# Patient Record
Sex: FEMALE | Race: WHITE | NOT HISPANIC OR LATINO | Employment: PART TIME | URBAN - METROPOLITAN AREA
[De-identification: names, ages, dates, MRNs, and addresses within clinical notes are randomized per-mention and may not be internally consistent; named-entity substitution may affect disease eponyms.]

---
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
## 2022-04-28 DIAGNOSIS — R74.8 LOW SERUM ALKALINE PHOSPHATASE: ICD-10-CM

## 2022-04-28 DIAGNOSIS — E83.39 ADULT HYPOPHOSPHATASIA: Primary | ICD-10-CM

## 2022-04-28 DIAGNOSIS — E78.5 DYSLIPIDEMIA: ICD-10-CM

## 2022-04-28 DIAGNOSIS — R94.6 THYROID FUNCTION TEST ABNORMAL: ICD-10-CM

## 2022-04-28 DIAGNOSIS — E55.9 HYPOVITAMINOSIS D: ICD-10-CM

## 2022-04-28 DIAGNOSIS — R73.09 DYSGLYCEMIA: ICD-10-CM

## 2022-04-28 DIAGNOSIS — R82.994 HYPERCALCIURIA: ICD-10-CM

## 2022-05-02 ENCOUNTER — PATIENT MESSAGE (OUTPATIENT)
Dept: ENDOCRINOLOGY | Facility: CLINIC | Age: 22
End: 2022-05-02

## 2022-05-02 ENCOUNTER — OFFICE VISIT (OUTPATIENT)
Dept: ENDOCRINOLOGY | Facility: CLINIC | Age: 22
End: 2022-05-02
Payer: COMMERCIAL

## 2022-05-02 DIAGNOSIS — E83.39 HYPOPHOSPHATASIA: Primary | ICD-10-CM

## 2022-05-02 DIAGNOSIS — E83.39: ICD-10-CM

## 2022-05-02 DIAGNOSIS — G47.10 HYPERSOMNOLENT: ICD-10-CM

## 2022-05-02 DIAGNOSIS — M85.80 OSTEOPENIA, UNSPECIFIED LOCATION: ICD-10-CM

## 2022-05-02 DIAGNOSIS — E55.9 HYPOVITAMINOSIS D: ICD-10-CM

## 2022-05-02 DIAGNOSIS — N92.6 MENSTRUAL IRREGULARITY: ICD-10-CM

## 2022-05-02 DIAGNOSIS — Z72.820 SLEEP DEPRIVATION: ICD-10-CM

## 2022-05-02 DIAGNOSIS — F98.8 ATTENTION DEFICIT DISORDER, UNSPECIFIED HYPERACTIVITY PRESENCE: ICD-10-CM

## 2022-05-02 DIAGNOSIS — R74.8 LOW SERUM ALKALINE PHOSPHATASE: ICD-10-CM

## 2022-05-02 DIAGNOSIS — R82.994 HYPERCALCIURIA: ICD-10-CM

## 2022-05-02 PROCEDURE — 1159F PR MEDICATION LIST DOCUMENTED IN MEDICAL RECORD: ICD-10-PCS | Mod: CPTII,95,, | Performed by: INTERNAL MEDICINE

## 2022-05-02 PROCEDURE — 1160F RVW MEDS BY RX/DR IN RCRD: CPT | Mod: CPTII,95,, | Performed by: INTERNAL MEDICINE

## 2022-05-02 PROCEDURE — 99214 OFFICE O/P EST MOD 30 MIN: CPT | Mod: 95,,, | Performed by: INTERNAL MEDICINE

## 2022-05-02 PROCEDURE — 99214 PR OFFICE/OUTPT VISIT, EST, LEVL IV, 30-39 MIN: ICD-10-PCS | Mod: 95,,, | Performed by: INTERNAL MEDICINE

## 2022-05-02 PROCEDURE — 1160F PR REVIEW ALL MEDS BY PRESCRIBER/CLIN PHARMACIST DOCUMENTED: ICD-10-PCS | Mod: CPTII,95,, | Performed by: INTERNAL MEDICINE

## 2022-05-02 PROCEDURE — 1159F MED LIST DOCD IN RCRD: CPT | Mod: CPTII,95,, | Performed by: INTERNAL MEDICINE

## 2022-05-02 NOTE — PROGRESS NOTES
Subjective:      Patient ID: Munira Ocasio is a 21 y.o. female.    Chief Complaint:     Patient is the daughter of another patient of the clinic (Rafaela Ocasio MRN; [id number])   seen in [facility] today for screening and up for possible adult hypophosphatasia. This is a video televisit and thus he examination aspects of the visit are limited.    History of Present Illness    The patient, Munira Ocasio is a 20 yr old lady seen in [facility] today on account of possible adult hypophatasia.  Her mother ((Rafaela Ocasio - MRN; [id number])) is a clinic patient as well.  This is a video televisit.  The patient location is: home  The chief complaint leading to consultation is: possible hypophosphatasia    Visit type: Synchronous video and audio televisit    Face to Face time with patient: ~ 30  minutes of total time spent on the encounter, which includes face to face time and non-face to face time preparing to see the patient (eg, review of tests), Obtaining and/or reviewing separately obtained history, Documenting clinical information in the electronic or other health record, Independently interpreting results (not separately reported) and communicating results to the patient/family/caregiver, or Care coordination (not separately reported).         Each patient to whom he or she provides medical services by telemedicine is:  (1) informed of the relationship between the physician and patient and the respective role of any other health care provider with respect to management of the patient; and (2) notified that he or she may decline to receive medical services by telemedicine and may withdraw from such care at any time.    Notes:     Her background comorbidities are as detailed below;  Patient Active Problem List   Diagnosis    Attention deficit disorder    Hypophosphatasia    Low serum alkaline phosphatase    Hypovitaminosis D    Sleep deprivation    Hypersomnolent    Menstrual irregularity     Hypercalciuria    Osteopenia    Renal phosphaturia     Patients baseline [place] is 20. Patient has never had a sleep study.   Patient complains of chronic fatigue over last 6 yrs.  Patient is hypersomnolent. She sleeps ~ 8-12hrs daily. She goes to sleep early.  Patient wakes up feeling tired. She is a deep sleeper and not restless during sleep.  She has a lot of day time somnolence. She has a lot of early morning headaches.  She does not dream frequently.    Periods; irregular. 5-8/ she often skips periods She had periods once every 6months last year  Menarche; 17yrs. She has always had irregular periods.  She has had 1-2 periods thus far this year.   Grav 0.  Patient does not smoke, Patient drinks ETOH very irregularly.  Patient just graduated from high school.    Has menorrhagia++ and dysmenorrhea.. She has some hirsutism.  She had significant facial, back and chest acneiform eruptions.    Patient has never had any fractures. She does not have frequent dental problems. She did loss her early front teeth early when young.    She has generalized myalgias and arthralgias which are long standing.  She is having generalized body and bone aches and associated fatigue.         Review of Systems   Constitutional: Positive for fatigue (chronic). Negative for activity change, appetite change, chills and diaphoresis.   HENT: Negative for facial swelling, hearing loss, sinus pressure, sore throat, trouble swallowing and voice change.    Eyes: Negative for photophobia and visual disturbance.   Respiratory: Negative for apnea, cough, chest tightness, shortness of breath, wheezing and stridor.    Cardiovascular: Negative for chest pain, palpitations and leg swelling.   Gastrointestinal: Negative for abdominal distention, abdominal pain, constipation, diarrhea, nausea and vomiting.   Endocrine: Negative for cold intolerance, heat intolerance, polydipsia, polyphagia and polyuria.   Genitourinary: Positive for menstrual problem  (long standing menstrual irregularity as detailed above.). Negative for difficulty urinating, dysuria, flank pain, frequency, hematuria and urgency.   Musculoskeletal: Positive for arthralgias (multiple joints; mainly of weight bearing joints.) and myalgias (chronic). Negative for back pain, gait problem, joint swelling, neck pain and neck stiffness.   Skin: Negative for color change, pallor and rash.   Neurological: Positive for weakness (intermittent). Negative for dizziness, tremors, seizures, syncope, light-headedness, numbness and headaches.   Hematological: Does not bruise/bleed easily.   Psychiatric/Behavioral: Positive for sleep disturbance (As detailed above.). Negative for agitation, confusion, dysphoric mood and hallucinations. The patient is not nervous/anxious.        Objective: Nil video televisit.         Lab Review:     Results for RHETT TUBBS (MRN [id number]) as of 5/3/2021 09:15   Ref. Range 2/22/2016 11:44 3/13/2021 11:02   WBC Latest Ref Range: 3.9 - 12.7 K/uL  5.36   RBC Latest Ref Range: 4.00 - 5.40 M/uL  4.42   Hemoglobin Latest Ref Range: 12.0 - 16.0 g/dL  12.7   Hematocrit Latest Ref Range: 37.0 - 48.5 %  38.5   MCV Latest Ref Range: 82.0 - 98.0 fL  87   MCH Latest Ref Range: 27.0 - 31.0 pg  28.7   MCHC Latest Ref Range: 32.0 - 36.0 g/dL  33.0   RDW Latest Ref Range: 11.5 - 14.5 %  12.5   Platelets Latest Ref Range: 150 - 350 K/uL  251   MPV Latest Ref Range: 9.2 - 12.9 fL  10.4   Gran % Latest Ref Range: 38.0 - 73.0 %  44.6   Lymph % Latest Ref Range: 18.0 - 48.0 %  46.6   Mono % Latest Ref Range: 4.0 - 15.0 %  7.1   Eosinophil % Latest Ref Range: 0.0 - 8.0 %  1.1   Basophil % Latest Ref Range: 0.0 - 1.9 %  0.4   Immature Granulocytes Latest Ref Range: 0.0 - 0.5 %  0.2   Gran # (ANC) Latest Ref Range: 1.8 - 7.7 K/uL  2.4   Lymph # Latest Ref Range: 1.0 - 4.8 K/uL  2.5   Mono # Latest Ref Range: 0.3 - 1.0 K/uL  0.4   Eos # Latest Ref Range: 0.0 - 0.5 K/uL  0.1   Baso # Latest Ref Range:  0.00 - 0.20 K/uL  0.02   Immature Grans (Abs) Latest Ref Range: 0.00 - 0.04 K/uL  0.01   nRBC Latest Ref Range: 0 /100 WBC  0   Differential Method Unknown  Automated   Sodium Latest Ref Range: 136 - 145 mmol/L  140   Potassium Latest Ref Range: 3.5 - 5.1 mmol/L  4.0   Chloride Latest Ref Range: 95 - 110 mmol/L  108   CO2 Latest Ref Range: 23 - 29 mmol/L  24   Anion Gap Latest Ref Range: 8 - 16 mmol/L  8   BUN Latest Ref Range: 6 - 20 mg/dL  13   Creatinine Latest Ref Range: 0.5 - 1.4 mg/dL  0.7   eGFR if non African American Latest Ref Range: >60 mL/min/1.73 m^2  >60.0   eGFR if African American Latest Ref Range: >60 mL/min/1.73 m^2  >60.0   Glucose Latest Ref Range: 70 - 110 mg/dL  83   Calcium Latest Ref Range: 8.7 - 10.5 mg/dL  8.8   Alkaline Phosphatase Latest Ref Range: 48 - 95 U/L  28 (L)   PROTEIN TOTAL Latest Ref Range: 6.0 - 8.4 g/dL  6.4   Albumin Latest Ref Range: 3.2 - 4.7 g/dL  3.9   BILIRUBIN TOTAL Latest Ref Range: 0.1 - 1.0 mg/dL  0.6   AST Latest Ref Range: 10 - 40 U/L  14   ALT Latest Ref Range: 10 - 44 U/L  7 (L)   Triglycerides Latest Ref Range: 30 - 150 mg/dL  33   Cholesterol Latest Ref Range: 120 - 199 mg/dL  127   HDL Latest Ref Range: 40 - 75 mg/dL  56   HDL/Cholesterol Ratio Latest Ref Range: 20.0 - 50.0 %  44.1   LDL Cholesterol External Latest Ref Range: 63 - 159 mg/dL  64.4   Non-HDL Cholesterol Latest Units: mg/dL  71   Total Cholesterol/HDL Ratio Latest Ref Range: 2.0 - 5.0   2.3   TSH Latest Ref Range: 0.40 - 4.00 uIU/mL  0.581   Hepatitis C Ab Latest Ref Range: Negative   Negative   HIV 1/2 Ag/Ab Latest Ref Range: Negative   Negative   XR KNEE ORTHO RIGHT WITH FLEXION Unknown Rpt        Assessment:     1. Hypophosphatasia     2. Hypovitaminosis D     3. Osteopenia, unspecified location     4. Low serum alkaline phosphatase     5. Hypersomnolent     6. Hypercalciuria     7. Renal phosphaturia     8. Attention deficit disorder, unspecified hyperactivity presence     9. Sleep  deprivation     10. Menstrual irregularity  US Pelvis Complete Non OB    Testosterone Panel    Estradiol    Follicle Stimulating Hormone    Estrogens, Total    Progesterone    Prolactin    Dihydrotestosterone    Luteinizing Hormone      Regarding low alkaline phosphatase levels; Given the patients history and her associated sympotomatology and +ve family history it does appear that she has adult hypophosphatasia. To obtain confirmatory labs as detailed above and once diagnosis is confirmed will arrange to commence repletion therapy with alpha asofatase (strensiq). [facility] was not able to get the definitive lab testing done @ her last visit.  Regarding ADHD; stable to continue stimulant amphetamine therapy as before.  Regarding menstrual irregularity with mild hirsutism;  To retrieve recent pelvic USS results and obtain relevant labs as detailed above.  Regarding chronic sleep deprivation; to obtain formal sleep study to evaluate patient on account of hypersomnolence, non restorative sleep and chronic fatigue.  Regarding possible hypovitaminosis D; to check 25 OH vit D levels and replete as needed.  Regarding menstrual irregularities; to recheck basic ovarian functional testing and obtain pelvic ultrasound.      Plan:     FFup in 6 mths

## 2022-05-05 ENCOUNTER — OFFICE VISIT (OUTPATIENT)
Dept: FAMILY MEDICINE | Facility: CLINIC | Age: 22
End: 2022-05-05
Payer: COMMERCIAL

## 2022-05-05 VITALS
HEART RATE: 89 BPM | OXYGEN SATURATION: 96 % | BODY MASS INDEX: 22.1 KG/M2 | WEIGHT: 117.06 LBS | SYSTOLIC BLOOD PRESSURE: 100 MMHG | DIASTOLIC BLOOD PRESSURE: 60 MMHG | HEIGHT: 61 IN

## 2022-05-05 DIAGNOSIS — R05.9 COUGH: Primary | ICD-10-CM

## 2022-05-05 DIAGNOSIS — L50.8 CHRONIC URTICARIA: ICD-10-CM

## 2022-05-05 DIAGNOSIS — R06.02 SOB (SHORTNESS OF BREATH): ICD-10-CM

## 2022-05-05 DIAGNOSIS — F90.9 ATTENTION DEFICIT HYPERACTIVITY DISORDER (ADHD), UNSPECIFIED ADHD TYPE: ICD-10-CM

## 2022-05-05 DIAGNOSIS — E83.39 HYPOPHOSPHATASIA: ICD-10-CM

## 2022-05-05 PROCEDURE — 1159F MED LIST DOCD IN RCRD: CPT | Mod: CPTII,S$GLB,, | Performed by: NURSE PRACTITIONER

## 2022-05-05 PROCEDURE — 1159F PR MEDICATION LIST DOCUMENTED IN MEDICAL RECORD: ICD-10-PCS | Mod: CPTII,S$GLB,, | Performed by: NURSE PRACTITIONER

## 2022-05-05 PROCEDURE — 1160F RVW MEDS BY RX/DR IN RCRD: CPT | Mod: CPTII,S$GLB,, | Performed by: NURSE PRACTITIONER

## 2022-05-05 PROCEDURE — 96372 PR INJECTION,THERAP/PROPH/DIAG2ST, IM OR SUBCUT: ICD-10-PCS | Mod: S$GLB,,, | Performed by: NURSE PRACTITIONER

## 2022-05-05 PROCEDURE — 99214 OFFICE O/P EST MOD 30 MIN: CPT | Mod: 25,S$GLB,, | Performed by: NURSE PRACTITIONER

## 2022-05-05 PROCEDURE — 3078F DIAST BP <80 MM HG: CPT | Mod: CPTII,S$GLB,, | Performed by: NURSE PRACTITIONER

## 2022-05-05 PROCEDURE — 3008F BODY MASS INDEX DOCD: CPT | Mod: CPTII,S$GLB,, | Performed by: NURSE PRACTITIONER

## 2022-05-05 PROCEDURE — 99999 PR PBB SHADOW E&M-EST. PATIENT-LVL V: CPT | Mod: PBBFAC,,, | Performed by: NURSE PRACTITIONER

## 2022-05-05 PROCEDURE — 1160F PR REVIEW ALL MEDS BY PRESCRIBER/CLIN PHARMACIST DOCUMENTED: ICD-10-PCS | Mod: CPTII,S$GLB,, | Performed by: NURSE PRACTITIONER

## 2022-05-05 PROCEDURE — 99214 PR OFFICE/OUTPT VISIT, EST, LEVL IV, 30-39 MIN: ICD-10-PCS | Mod: 25,S$GLB,, | Performed by: NURSE PRACTITIONER

## 2022-05-05 PROCEDURE — 94640 AIRWAY INHALATION TREATMENT: CPT | Mod: 59,S$GLB,, | Performed by: NURSE PRACTITIONER

## 2022-05-05 PROCEDURE — 3074F PR MOST RECENT SYSTOLIC BLOOD PRESSURE < 130 MM HG: ICD-10-PCS | Mod: CPTII,S$GLB,, | Performed by: NURSE PRACTITIONER

## 2022-05-05 PROCEDURE — 3078F PR MOST RECENT DIASTOLIC BLOOD PRESSURE < 80 MM HG: ICD-10-PCS | Mod: CPTII,S$GLB,, | Performed by: NURSE PRACTITIONER

## 2022-05-05 PROCEDURE — 3074F SYST BP LT 130 MM HG: CPT | Mod: CPTII,S$GLB,, | Performed by: NURSE PRACTITIONER

## 2022-05-05 PROCEDURE — 96372 THER/PROPH/DIAG INJ SC/IM: CPT | Mod: S$GLB,,, | Performed by: NURSE PRACTITIONER

## 2022-05-05 PROCEDURE — 99999 PR PBB SHADOW E&M-EST. PATIENT-LVL V: ICD-10-PCS | Mod: PBBFAC,,, | Performed by: NURSE PRACTITIONER

## 2022-05-05 PROCEDURE — 94640 PR INHAL RX, AIRWAY OBST/DX SPUTUM INDUCT: ICD-10-PCS | Mod: 59,S$GLB,, | Performed by: NURSE PRACTITIONER

## 2022-05-05 PROCEDURE — 3008F PR BODY MASS INDEX (BMI) DOCUMENTED: ICD-10-PCS | Mod: CPTII,S$GLB,, | Performed by: NURSE PRACTITIONER

## 2022-05-05 RX ORDER — FLUOXETINE 10 MG/1
10 CAPSULE ORAL DAILY
COMMUNITY
Start: 2022-01-05 | End: 2023-07-18

## 2022-05-05 RX ORDER — IPRATROPIUM BROMIDE AND ALBUTEROL SULFATE 2.5; .5 MG/3ML; MG/3ML
3 SOLUTION RESPIRATORY (INHALATION)
Status: COMPLETED | OUTPATIENT
Start: 2022-05-05 | End: 2022-05-05

## 2022-05-05 RX ORDER — BETAMETHASONE SODIUM PHOSPHATE AND BETAMETHASONE ACETATE 3; 3 MG/ML; MG/ML
6 INJECTION, SUSPENSION INTRA-ARTICULAR; INTRALESIONAL; INTRAMUSCULAR; SOFT TISSUE ONCE
Status: COMPLETED | OUTPATIENT
Start: 2022-05-05 | End: 2022-05-05

## 2022-05-05 RX ORDER — DOXYCYCLINE 100 MG/1
100 CAPSULE ORAL 2 TIMES DAILY
Qty: 14 CAPSULE | Refills: 0 | Status: SHIPPED | OUTPATIENT
Start: 2022-05-05 | End: 2022-05-12

## 2022-05-05 RX ORDER — PROMETHAZINE HYDROCHLORIDE AND DEXTROMETHORPHAN HYDROBROMIDE 6.25; 15 MG/5ML; MG/5ML
SYRUP ORAL
COMMUNITY
Start: 2022-05-03 | End: 2023-07-18

## 2022-05-05 RX ORDER — BALOXAVIR MARBOXIL 40 MG/1
TABLET, FILM COATED ORAL DAILY PRN
COMMUNITY
Start: 2022-05-03 | End: 2023-07-18

## 2022-05-05 RX ORDER — IPRATROPIUM BROMIDE AND ALBUTEROL SULFATE 2.5; .5 MG/3ML; MG/3ML
3 SOLUTION RESPIRATORY (INHALATION) EVERY 6 HOURS PRN
Qty: 75 ML | Refills: 2 | Status: SHIPPED | OUTPATIENT
Start: 2022-05-05 | End: 2023-07-18

## 2022-05-05 RX ORDER — AZITHROMYCIN 250 MG/1
250 TABLET, FILM COATED ORAL
COMMUNITY
Start: 2022-05-03 | End: 2023-07-18

## 2022-05-05 RX ADMIN — BETAMETHASONE SODIUM PHOSPHATE AND BETAMETHASONE ACETATE 6 MG: 3; 3 INJECTION, SUSPENSION INTRA-ARTICULAR; INTRALESIONAL; INTRAMUSCULAR; SOFT TISSUE at 04:05

## 2022-05-05 RX ADMIN — IPRATROPIUM BROMIDE AND ALBUTEROL SULFATE 3 ML: 2.5; .5 SOLUTION RESPIRATORY (INHALATION) at 04:05

## 2022-05-05 NOTE — PROGRESS NOTES
Subjective:       Patient ID: Munira Ocasio is a 21 y.o. female.    Chief Complaint: Nasal Congestion and Headache    HPI  Patient presents today for follow up visit. She is new to me. history of depression anxiety and ADHD currently on Prozac, on Ritalin. She c/o nasal congestion and headache.    5/2/22: endo [place] hypophosphatasia, hypovitaminosis  11/13/21 ER visit for PVC: CBC with normal white blood cell count normal H&H, normal chemistry, she has a non elevated troponin, non elevated D-dimer.  Her chest x-ray is normal, her EKG normal sinus rhythm with a rate of 72 beats per minute with sinus arrhythmia noted.  Her vitals are stable she appears in no distress.  She is taking Ritalin stimulant medicine which could contribute to her symptoms also admits to caffeine   7/21 Heather Welsh for depression and ADHD  Past Medical History:   Diagnosis Date    ADHD (attention deficit hyperactivity disorder)     Anxiety     Depression        Review of patient's allergies indicates:  No Known Allergies      Current Outpatient Medications:     azithromycin (Z-MATILDA) 250 MG tablet, Take 250 mg by mouth., Disp: , Rfl:     busPIRone (BUSPAR) 5 MG Tab, , Disp: , Rfl:     FLUoxetine 20 MG capsule, Take 20 mg by mouth once daily., Disp: , Rfl:     loratadine (CLARITIN) 10 mg tablet, Take 10 mg by mouth once daily., Disp: , Rfl:     methylphenidate (RITALIN) 5 MG tablet, Take 5 mg by mouth 2 (two) times daily., Disp: , Rfl:     promethazine-dextromethorphan (PROMETHAZINE-DM) 6.25-15 mg/5 mL Syrp, Take by mouth., Disp: , Rfl:     XOFLUZA 40 mg tablet, Take by mouth daily as needed., Disp: , Rfl:     albuterol-ipratropium (DUO-NEB) 2.5 mg-0.5 mg/3 mL nebulizer solution, Take 3 mLs by nebulization every 6 (six) hours as needed for Wheezing. Rescue, Disp: 75 mL, Rfl: 2    doxycycline (MONODOX) 100 MG capsule, Take 1 capsule (100 mg total) by mouth 2 (two) times daily. for 7 days, Disp: 14 capsule, Rfl: 0     "FLUoxetine 10 MG capsule, Take 10 mg by mouth once daily., Disp: , Rfl:     Review of Systems   Constitutional: Positive for fatigue. Negative for unexpected weight change.   HENT: Positive for rhinorrhea, sinus pressure and sinus pain. Negative for sore throat and trouble swallowing.    Eyes: Negative for visual disturbance.   Respiratory: Positive for cough and shortness of breath.    Cardiovascular: Positive for chest pain. Negative for palpitations and leg swelling.   Gastrointestinal: Negative for blood in stool.   Genitourinary: Negative for hematuria.   Skin: Negative for rash.   Allergic/Immunologic: Negative for immunocompromised state.   Neurological: Negative for headaches.   Hematological: Does not bruise/bleed easily.   Psychiatric/Behavioral: Negative for agitation. The patient is not nervous/anxious.        Objective:      /60 (BP Location: Left arm, Patient Position: Sitting, BP Method: Small (Manual))   Pulse 89   Ht 5' 1" (1.549 m)   Wt 53.1 kg (117 lb 1 oz)   LMP 04/29/2022 (Approximate)   SpO2 96%   BMI 22.12 kg/m²   Physical Exam  Constitutional:       Appearance: She is well-developed.   Eyes:      Pupils: Pupils are equal, round, and reactive to light.   Cardiovascular:      Rate and Rhythm: Normal rate and regular rhythm.      Heart sounds: Normal heart sounds.   Pulmonary:      Effort: Pulmonary effort is normal.      Breath sounds: Normal breath sounds.   Abdominal:      General: Bowel sounds are normal.      Palpations: Abdomen is soft.   Musculoskeletal:         General: Normal range of motion.      Cervical back: Normal range of motion.   Skin:     General: Skin is warm and dry.   Neurological:      Mental Status: She is alert and oriented to person, place, and time.   Psychiatric:         Behavior: Behavior normal.         Thought Content: Thought content normal.         Judgment: Judgment normal.         Assessment:       1. Cough    2. SOB (shortness of breath)    3. " Chronic urticaria    4. Attention deficit hyperactivity disorder (ADHD), unspecified ADHD type    5. Hypophosphatasia    6. BMI 22.0-22.9, adult        Plan:       Cough  -     X-Ray Chest PA And Lateral; Future; Expected date: 05/05/2022  -     betamethasone acetate-betamethasone sodium phosphate injection 6 mg  -     albuterol-ipratropium 2.5 mg-0.5 mg/3 mL nebulizer solution 3 mL  -     doxycycline (MONODOX) 100 MG capsule; Take 1 capsule (100 mg total) by mouth 2 (two) times daily. for 7 days  Dispense: 14 capsule; Refill: 0  -     albuterol-ipratropium (DUO-NEB) 2.5 mg-0.5 mg/3 mL nebulizer solution; Take 3 mLs by nebulization every 6 (six) hours as needed for Wheezing. Rescue  Dispense: 75 mL; Refill: 2    SOB (shortness of breath)  -     X-Ray Chest PA And Lateral; Future; Expected date: 05/05/2022  -     albuterol-ipratropium 2.5 mg-0.5 mg/3 mL nebulizer solution 3 mL    Chronic urticaria  -     betamethasone acetate-betamethasone sodium phosphate injection 6 mg    Attention deficit hyperactivity disorder (ADHD), unspecified ADHD type  Stable, continue follow up  Hypophosphatasia  Stable, continue endo follow up  BMI 22.0-22.9, adult  Stable, continue managment        Time spent with patient: 20 minutes    Patient with be reevaluated in 4 weeks or sooner prn    Greater than 50% of this visit was spent counseling as described in above documentation:Yes

## 2022-05-05 NOTE — PROGRESS NOTES
Identified name and . Administered 6 mg betamethasone acetate, IM. Patient tolerated well, aseptic technique maintained. Pain scale 0/10 with injection. No residual bleeding at insertion site noted.    Pt name and  verified. Albuterol ipratropium administered via nebulization. Pt tolerated well, provider will check breath sounds after administration.

## 2022-05-06 ENCOUNTER — HOSPITAL ENCOUNTER (OUTPATIENT)
Dept: RADIOLOGY | Facility: CLINIC | Age: 22
Discharge: HOME | End: 2022-05-06
Attending: NURSE PRACTITIONER
Payer: COMMERCIAL

## 2022-05-06 DIAGNOSIS — R06.02 SOB (SHORTNESS OF BREATH): ICD-10-CM

## 2022-05-06 DIAGNOSIS — R05.9 COUGH: ICD-10-CM

## 2022-05-06 PROCEDURE — 71046 X-RAY EXAM CHEST 2 VIEWS: CPT | Mod: 26,,, | Performed by: RADIOLOGY

## 2022-05-06 PROCEDURE — 71046 X-RAY EXAM CHEST 2 VIEWS: CPT | Mod: TC,FY,PO

## 2022-05-06 PROCEDURE — 71046 XR CHEST PA AND LATERAL: ICD-10-PCS | Mod: 26,,, | Performed by: RADIOLOGY

## 2022-05-11 ENCOUNTER — HOSPITAL ENCOUNTER (OUTPATIENT)
Dept: RADIOLOGY | Facility: HOSPITAL | Age: 22
Discharge: HOME | End: 2022-05-11
Attending: INTERNAL MEDICINE
Payer: COMMERCIAL

## 2022-05-11 ENCOUNTER — LAB VISIT (OUTPATIENT)
Dept: LAB | Facility: HOSPITAL | Age: 22
End: 2022-05-11
Attending: INTERNAL MEDICINE
Payer: COMMERCIAL

## 2022-05-11 DIAGNOSIS — N92.6 MENSTRUAL IRREGULARITY: ICD-10-CM

## 2022-05-11 DIAGNOSIS — R82.994 HYPERCALCIURIA: ICD-10-CM

## 2022-05-11 DIAGNOSIS — E55.9 HYPOVITAMINOSIS D: ICD-10-CM

## 2022-05-11 DIAGNOSIS — E78.5 DYSLIPIDEMIA: ICD-10-CM

## 2022-05-11 DIAGNOSIS — R94.6 THYROID FUNCTION TEST ABNORMAL: ICD-10-CM

## 2022-05-11 DIAGNOSIS — R73.09 DYSGLYCEMIA: ICD-10-CM

## 2022-05-11 DIAGNOSIS — R74.8 LOW SERUM ALKALINE PHOSPHATASE: ICD-10-CM

## 2022-05-11 DIAGNOSIS — E83.39 ADULT HYPOPHOSPHATASIA: ICD-10-CM

## 2022-05-11 LAB
25(OH)D3+25(OH)D2 SERPL-MCNC: 26 NG/ML (ref 30–96)
BASOPHILS # BLD AUTO: 0.05 K/UL (ref 0–0.2)
BASOPHILS NFR BLD: 0.7 % (ref 0–1.9)
CA-I BLDV-SCNC: 1.25 MMOL/L (ref 1.06–1.42)
DIFFERENTIAL METHOD: ABNORMAL
EOSINOPHIL # BLD AUTO: 0.1 K/UL (ref 0–0.5)
EOSINOPHIL NFR BLD: 1.1 % (ref 0–8)
ERYTHROCYTE [DISTWIDTH] IN BLOOD BY AUTOMATED COUNT: 12.3 % (ref 11.5–14.5)
ESTIMATED AVG GLUCOSE: 91 MG/DL (ref 68–131)
HBA1C MFR BLD: 4.8 % (ref 4–5.6)
HCT VFR BLD AUTO: 42.9 % (ref 37–48.5)
HGB BLD-MCNC: 14 G/DL (ref 12–16)
IMM GRANULOCYTES # BLD AUTO: 0.15 K/UL (ref 0–0.04)
IMM GRANULOCYTES NFR BLD AUTO: 2.1 % (ref 0–0.5)
LYMPHOCYTES # BLD AUTO: 3 K/UL (ref 1–4.8)
LYMPHOCYTES NFR BLD: 42.5 % (ref 18–48)
MCH RBC QN AUTO: 28.6 PG (ref 27–31)
MCHC RBC AUTO-ENTMCNC: 32.6 G/DL (ref 32–36)
MCV RBC AUTO: 88 FL (ref 82–98)
MONOCYTES # BLD AUTO: 0.4 K/UL (ref 0.3–1)
MONOCYTES NFR BLD: 6.2 % (ref 4–15)
NEUTROPHILS # BLD AUTO: 3.4 K/UL (ref 1.8–7.7)
NEUTROPHILS NFR BLD: 47.4 % (ref 38–73)
NRBC BLD-RTO: 0 /100 WBC
PLATELET # BLD AUTO: 294 K/UL (ref 150–450)
PMV BLD AUTO: 9.5 FL (ref 9.2–12.9)
PTH-INTACT SERPL-MCNC: 57.7 PG/ML (ref 9–77)
RBC # BLD AUTO: 4.9 M/UL (ref 4–5.4)
WBC # BLD AUTO: 7.08 K/UL (ref 3.9–12.7)

## 2022-05-11 PROCEDURE — 80053 COMPREHEN METABOLIC PANEL: CPT | Performed by: INTERNAL MEDICINE

## 2022-05-11 PROCEDURE — 82306 VITAMIN D 25 HYDROXY: CPT | Performed by: INTERNAL MEDICINE

## 2022-05-11 PROCEDURE — 82542 COL CHROMOTOGRAPHY QUAL/QUAN: CPT | Performed by: INTERNAL MEDICINE

## 2022-05-11 PROCEDURE — 76856 US PELVIS COMPLETE NON OB: ICD-10-PCS | Mod: 26,,, | Performed by: RADIOLOGY

## 2022-05-11 PROCEDURE — 84144 ASSAY OF PROGESTERONE: CPT | Performed by: INTERNAL MEDICINE

## 2022-05-11 PROCEDURE — 84075 ASSAY ALKALINE PHOSPHATASE: CPT | Performed by: INTERNAL MEDICINE

## 2022-05-11 PROCEDURE — 84207 ASSAY OF VITAMIN B-6: CPT | Performed by: INTERNAL MEDICINE

## 2022-05-11 PROCEDURE — 30000890 HC MISC. SEND OUT TEST

## 2022-05-11 PROCEDURE — 84443 ASSAY THYROID STIM HORMONE: CPT | Performed by: INTERNAL MEDICINE

## 2022-05-11 PROCEDURE — 76856 US EXAM PELVIC COMPLETE: CPT | Mod: TC

## 2022-05-11 PROCEDURE — 83970 ASSAY OF PARATHORMONE: CPT | Performed by: INTERNAL MEDICINE

## 2022-05-11 PROCEDURE — 83036 HEMOGLOBIN GLYCOSYLATED A1C: CPT | Performed by: INTERNAL MEDICINE

## 2022-05-11 PROCEDURE — 83002 ASSAY OF GONADOTROPIN (LH): CPT | Performed by: INTERNAL MEDICINE

## 2022-05-11 PROCEDURE — 76856 US EXAM PELVIC COMPLETE: CPT | Mod: 26,,, | Performed by: RADIOLOGY

## 2022-05-11 PROCEDURE — 84075 ASSAY ALKALINE PHOSPHATASE: CPT | Mod: 91 | Performed by: INTERNAL MEDICINE

## 2022-05-11 PROCEDURE — 30000890 MISCELLANEOUS SENDOUT TEST, NON-BLOOD: Performed by: INTERNAL MEDICINE

## 2022-05-11 PROCEDURE — 84550 ASSAY OF BLOOD/URIC ACID: CPT | Performed by: INTERNAL MEDICINE

## 2022-05-11 PROCEDURE — 85025 COMPLETE CBC W/AUTO DIFF WBC: CPT | Performed by: INTERNAL MEDICINE

## 2022-05-11 PROCEDURE — 83001 ASSAY OF GONADOTROPIN (FSH): CPT | Performed by: INTERNAL MEDICINE

## 2022-05-11 PROCEDURE — 82040 ASSAY OF SERUM ALBUMIN: CPT | Performed by: INTERNAL MEDICINE

## 2022-05-11 PROCEDURE — 83735 ASSAY OF MAGNESIUM: CPT | Performed by: INTERNAL MEDICINE

## 2022-05-11 PROCEDURE — 84146 ASSAY OF PROLACTIN: CPT | Performed by: INTERNAL MEDICINE

## 2022-05-11 PROCEDURE — 82670 ASSAY OF TOTAL ESTRADIOL: CPT | Performed by: INTERNAL MEDICINE

## 2022-05-11 PROCEDURE — 82139 AMINO ACIDS QUAN 6 OR MORE: CPT | Performed by: INTERNAL MEDICINE

## 2022-05-11 PROCEDURE — 82330 ASSAY OF CALCIUM: CPT | Performed by: INTERNAL MEDICINE

## 2022-05-11 PROCEDURE — 80061 LIPID PANEL: CPT | Performed by: INTERNAL MEDICINE

## 2022-05-11 PROCEDURE — 84100 ASSAY OF PHOSPHORUS: CPT | Performed by: INTERNAL MEDICINE

## 2022-05-11 PROCEDURE — 82642 DIHYDROTESTOSTERONE: CPT | Performed by: INTERNAL MEDICINE

## 2022-05-11 PROCEDURE — 82672 ASSAY OF ESTROGEN: CPT | Performed by: INTERNAL MEDICINE

## 2022-05-11 PROCEDURE — 30000890 MAYO MISCELLANEOUS TEST (REFLEX): Performed by: INTERNAL MEDICINE

## 2022-05-11 PROCEDURE — 84207 ASSAY OF VITAMIN B-6: CPT

## 2022-05-12 LAB
ALBUMIN SERPL BCP-MCNC: 4.1 G/DL (ref 3.5–5.2)
ALP SERPL-CCNC: 26 U/L (ref 55–135)
ALT SERPL W/O P-5'-P-CCNC: 15 U/L (ref 10–44)
ANION GAP SERPL CALC-SCNC: 9 MMOL/L (ref 8–16)
AST SERPL-CCNC: 20 U/L (ref 10–40)
BILIRUB SERPL-MCNC: 0.4 MG/DL (ref 0.1–1)
BUN SERPL-MCNC: 11 MG/DL (ref 6–20)
CALCIUM SERPL-MCNC: 9.6 MG/DL (ref 8.7–10.5)
CHLORIDE SERPL-SCNC: 106 MMOL/L (ref 95–110)
CHOLEST SERPL-MCNC: 148 MG/DL (ref 120–199)
CHOLEST/HDLC SERPL: 2.6 {RATIO} (ref 2–5)
CO2 SERPL-SCNC: 24 MMOL/L (ref 23–29)
CREAT SERPL-MCNC: 0.7 MG/DL (ref 0.5–1.4)
EST. GFR  (AFRICAN AMERICAN): >60 ML/MIN/1.73 M^2
EST. GFR  (NON AFRICAN AMERICAN): >60 ML/MIN/1.73 M^2
ESTRADIOL SERPL-MCNC: 28 PG/ML
FSH SERPL-ACNC: 6.04 MIU/ML
GLUCOSE SERPL-MCNC: 83 MG/DL (ref 70–110)
HDLC SERPL-MCNC: 58 MG/DL (ref 40–75)
HDLC SERPL: 39.2 % (ref 20–50)
LDLC SERPL CALC-MCNC: 82.2 MG/DL (ref 63–159)
LH SERPL-ACNC: 14.2 MIU/ML
MAGNESIUM SERPL-MCNC: 2.1 MG/DL (ref 1.6–2.6)
NONHDLC SERPL-MCNC: 90 MG/DL
PHOSPHATE SERPL-MCNC: 4.9 MG/DL (ref 2.7–4.5)
POTASSIUM SERPL-SCNC: 4 MMOL/L (ref 3.5–5.1)
PROGEST SERPL-MCNC: 0.4 NG/ML
PROLACTIN SERPL IA-MCNC: 12.4 NG/ML (ref 5.2–26.5)
PROT SERPL-MCNC: 6.9 G/DL (ref 6–8.4)
SODIUM SERPL-SCNC: 139 MMOL/L (ref 136–145)
TRIGL SERPL-MCNC: 39 MG/DL (ref 30–150)
TSH SERPL DL<=0.005 MIU/L-ACNC: 0.84 UIU/ML (ref 0.4–4)
URATE SERPL-MCNC: 4.6 MG/DL (ref 2.4–5.7)

## 2022-05-16 LAB
ALP BONE SERPL-MCNC: 5.5 UG/L (ref 4.7–17.8)
ESTROGEN SERPL-MCNC: 61 PG/ML
MAYO MISCELLANEOUS RESULT (REF): NORMAL

## 2022-05-17 DIAGNOSIS — E83.39 ADULT HYPOPHOSPHATASIA: Primary | ICD-10-CM

## 2022-05-17 LAB
ALBUMIN SERPL-MCNC: 4.3 G/DL (ref 3.6–5.1)
MAYO MISCELLANEOUS RESULT (REF): NORMAL
PYRIDOXAL SERPL-MCNC: 83 UG/L (ref 5–50)
SHBG SERPL-SCNC: 36 NMOL/L (ref 17–124)
TESTOST FREE SERPL-MCNC: 2 PG/ML (ref 0.2–5)
TESTOST SERPL-MCNC: 18 NG/DL (ref 2–45)
TESTOSTERONE.FREE+WB SERPL-MCNC: 3.9 NG/DL (ref 0.5–8.5)

## 2022-05-17 RX ORDER — ASFOTASE ALFA 80 MG/.8ML
80 SOLUTION SUBCUTANEOUS
Qty: 36 EACH | Refills: 3 | Status: SHIPPED | OUTPATIENT
Start: 2022-05-18 | End: 2022-08-23 | Stop reason: SDUPTHER

## 2022-05-18 ENCOUNTER — SPECIALTY PHARMACY (OUTPATIENT)
Dept: PHARMACY | Facility: CLINIC | Age: 22
End: 2022-05-18

## 2022-05-18 NOTE — TELEPHONE ENCOUNTER
Strensiq PA submitted via CM    If approved must go to [facility].

## 2022-05-20 LAB — ANDROSTANOLONE SERPL-MCNC: 80 PG/ML

## 2022-05-25 LAB
ALP BONE CFR SERPL: ABNORMAL % (ref 28–66)
ALP INTEST CFR SERPL: ABNORMAL % (ref 1–24)
ALP ISOS SERPL-IMP: ABNORMAL
ALP LIVER CFR SERPL: ABNORMAL % (ref 25–69)
ALP MACROHEPATIC CFR SERPL: ABNORMAL %
ALP PLAC CFR SERPL: ABNORMAL %
ALP SERPL-CCNC: 25 U/L (ref 36–128)

## 2022-05-26 ENCOUNTER — TELEPHONE (OUTPATIENT)
Dept: ENDOCRINOLOGY | Facility: CLINIC | Age: 22
End: 2022-05-26
Payer: COMMERCIAL

## 2022-05-26 ENCOUNTER — PATIENT MESSAGE (OUTPATIENT)
Dept: ENDOCRINOLOGY | Facility: CLINIC | Age: 22
End: 2022-05-26
Payer: COMMERCIAL

## 2022-05-26 LAB
GENETIC COUNSELING?: NO
GENSO SPECIMEN TYPE: NORMAL
MISCELLANEOUS GENETIC TEST NAME: NORMAL
PARTENTAL OR SIBLING TESTING?: NO
REFERENCE LAB: NORMAL
TEST RESULT: NORMAL

## 2022-05-26 NOTE — TELEPHONE ENCOUNTER
6 month appointment made.    Identifiers x 2. Patient inquiring to the approximate time that she will be released from hospital. Scheduled post EKG.     Future Appointments   Date Time Provider Hazel Wade   6/19/2020 10:45 AM [facility] CATH LAB 2 [facility]   6/26/2020 11:30 AM [facility], [address] [address]   9/14/2020  9:00 AM Ced Hewitt  [address]

## 2022-05-26 NOTE — TELEPHONE ENCOUNTER
----- Message from Jessica Myles sent at 5/26/2022  3:15 PM CDT -----  Contact: [phone]  Type:  Patient Returning Call    Who Called:  Pt  Who Left Message for Patient:  Carline  Does the patient know what this is regarding?:  No  Best Call Back Number:  [phone]    Additional Information:  Pt stated that office called her moms phone and now she is calling back

## 2022-05-27 NOTE — TELEPHONE ENCOUNTER
Incoming call from Brooke WATTERS from [employer] looking for status check on StrensSticky. [facility] states pt's mom has been frequently calling office for updates. Informed PA was submitted on 5/18 and per CMM it is still undergoing review. Notified Josee RODRIGUEZ to obtain status check from insurance. [facility] is aware drug is LDD.

## 2022-05-30 NOTE — TELEPHONE ENCOUNTER
Called PA line to check status - closed for the holiday.  Will followup.   Called patient's mom to inform - N/A LVM

## 2022-05-31 NOTE — TELEPHONE ENCOUNTER
Spoke to agent on PA line - states we must submit another PA on the website [employer]  Submitted successfully.

## 2022-06-08 LAB
MISCELLANEOUS TEST NAME: NORMAL
REFERENCE LAB: NORMAL
SPECIMEN TYPE: NORMAL
TEST RESULT: NORMAL

## 2022-06-17 NOTE — TELEPHONE ENCOUNTER
Strensiq denied due to the following - patent does not have a diagnosis of either /infantile or juvenile onset hypophosphatasia    Patient must have 1 - at onset of symptoms less than 18 years of age, 2 - clinical evidence of diagnosis prior to age 18 - such as Vitamin B6 dependent seizures and skeletal abnormalites, 3 - radiographic imaging at the age of onset prior to 18, 4 - genetic testing documenting mutations in the ALPL gene, 5 - reduced activity of unfractionated serum alkaline phosphatase w/o bisphosphanate AND one of the following lab values: elevated PEA (phosphoethanolamine), elevated PLP (pyridoxal 5-phospate)  w/o violet supplements, or elevated PPi (urinary inorganic pyrophospate).     Routed to provider.

## 2022-06-20 ENCOUNTER — TELEPHONE (OUTPATIENT)
Dept: ENDOCRINOLOGY | Facility: CLINIC | Age: 22
End: 2022-06-20
Payer: COMMERCIAL

## 2022-07-25 NOTE — TELEPHONE ENCOUNTER
Called for Appeal Status update - Strensiq appeal denied 6/21/22 - requested appeal denial letter be refaxed

## 2022-08-22 NOTE — TELEPHONE ENCOUNTER
Never received appeal denial after several request - awaiting answer from [facility] if they ever received it.    Sent to financial assistance to see if there are any options since appeal has been denied.

## 2022-08-23 ENCOUNTER — TELEPHONE (OUTPATIENT)
Dept: ENDOCRINOLOGY | Facility: CLINIC | Age: 22
End: 2022-08-23
Payer: COMMERCIAL

## 2022-08-23 DIAGNOSIS — E83.39 ADULT HYPOPHOSPHATASIA: ICD-10-CM

## 2022-08-23 RX ORDER — ASFOTASE ALFA 80 MG/.8ML
80 SOLUTION SUBCUTANEOUS
Qty: 36 EACH | Refills: 3 | Status: SHIPPED | OUTPATIENT
Start: 2022-08-24 | End: 2022-12-06 | Stop reason: SDUPTHER

## 2022-08-23 NOTE — TELEPHONE ENCOUNTER
Lilli Benitez, RN states that Lissett Singh with [employer] is working on getting the patient the Strensiq. Spoke to Lissett who states they have the start form but do not have the prescription. Request a prescription be sent to [facility] so they may work on the denial with the insurance. Spoke to Lilli Benitez, Lilli states to forward the rx [facility] has to [facility]. RX forwarded appropriately. Will close out patient at [facility].

## 2022-08-23 NOTE — TELEPHONE ENCOUNTER
----- Message from Mckenna Maravilla sent at 8/23/2022  4:00 PM CDT -----  Contact: juanito  Type:  Pharmacy Calling to Clarify an RX    Name of Caller:  Juanito  Pharmacy Name:    [facility]  # [phone], fax# [phone]  Prescription Name:  asfotase tray (STRENSIQ) 80 mg/0.8 mL Soln  What do they need to clarify?:  medication is weight based- need to verify dosage  Best Call Back Number:  [phone]  Additional Information:

## 2022-08-24 NOTE — TELEPHONE ENCOUNTER
Spoke with Patricia Funes with [facility] & clarified the Rx for:   Disp Refills Start End GUILHERME   asfotase tray (STRENSIQ) 80 mg/0.8 mL Soln 36 each 3 8/24/2022 11/22/2022 No   Sig - Route: Inject 80 mg into the skin 3 (three) times a week. - Subcutaneous   Sent to pharmacy as: asfotase tray (STRENSIQ) 80 mg/0.8 mL Soln

## 2022-08-26 ENCOUNTER — DOCUMENTATION ONLY (OUTPATIENT)
Dept: ENDOCRINOLOGY | Facility: CLINIC | Age: 22
End: 2022-08-26
Payer: COMMERCIAL

## 2022-08-26 NOTE — PROGRESS NOTES
This chart addendum is to indicate that the patients clinical symptoms of symptomatic hypophosphatasia including chronic fatigue and weakness, chronic polyarthralgias and myalgia had onset @ ~ age 14 yrs and thus though her diagnose was made @ age 20 yrs after the diagnosis of her mother, the patients age of symptom onset is consistent with symptomatic juvenile onset hypophosphatasia.

## 2022-08-30 ENCOUNTER — TELEPHONE (OUTPATIENT)
Dept: ENDOCRINOLOGY | Facility: CLINIC | Age: 22
End: 2022-08-30
Payer: COMMERCIAL

## 2022-08-30 NOTE — TELEPHONE ENCOUNTER
Faxed all documents to [facility] as directed per Grzegorz Eldridge. & informed Rylie,  that the documents were received & faxed to [employer].

## 2022-08-30 NOTE — TELEPHONE ENCOUNTER
----- Message from Tk Kirkland sent at 8/30/2022 11:49 AM CDT -----  Type: Needs Medical Advice  Who Called:  Jazmyn from [facility] pharmacy  Symptoms (please be specific):  said she faxed a PA packet and the sent to the insurance company--fax # included on the fax cover sheet--  Best Call Back Number: [phone]  Additional Information: please advise--thank you

## 2022-11-08 NOTE — TELEPHONE ENCOUNTER
----- Message from Josee Molina PharmD sent at 2022  4:34 PM CDT -----  TO request a dnao-ow-[id number] - Please call [phone]   They will ask for the best time to reach Dr. Salvador and a good contact number. You will need the information below    Member ID - [id number]  PA Number - [id number]    ----- Message -----  From: Lilli Benitez RN  Sent: 2022   3:43 PM CDT  To: Josee Molina PharmD    Good afternoon!  Dr. Salvador would like to set up a [id number]. Can you accomplish this task?  ----- Message -----  From: Josee Molina PharmD  Sent: 2022  10:49 AM CDT  To: Yomi Salvador MD, [facility] Staff    Good Afternoon,    The Strensiq has been denied by insurance due to the following reasons.    Patent does not have a diagnosis of either /in fantile or juvenile onset hypophosphatasia    Patient must have   1 - at onset of symptoms less than 18 years of age,   2 - clinical evidence of diagnosis prior to age 18 - such as Vitamin B6 dependent seizures and skeletal abnormalites,   3 - radiographic imaging at the age of onset prior to 18,   4 - genetic testing documenting mutations in the ALPL gene,   5 - reduced activity of unfractionated serum alkaline phosphatase w/o bisphosphanate AND one of the following lab values: elevated PEA (phosphoethanolamine), elevated PLP (pyridoxal 5-phospate)  w/o violet supplements, or elevated PPi (urinary inorganic pyrophospate).     Please advise as to how you would like to proceed. If we appeal we would need some documentation as to symptoms and testing prior to current age.       Thank You,    Josee Molina, PharmD    Specialty Pharmacy Clinical Pharmacist  [facility]  P: [phone]          
94 pages of records to include a detailed appeal letter was faxed to [phone] with a receipt confirmation.   
PA for asfotase tray (STRENSIQ) 80 mg/0.8 mL Soln has been approved.                  
Spoke with 3 representatives from [employer] with the last person being Gisell, Certified Pharm. Tech that all reported a azai-en-kypy cannot be conducted due to it's, past the 10-day window to conduct the [id number] & that the only way to appeal the decision is to submit the appeal in writing & fax.    The required information is in your mailbox with the required information highlighted.  
36.7

## 2022-12-06 ENCOUNTER — OFFICE VISIT (OUTPATIENT)
Dept: ENDOCRINOLOGY | Facility: CLINIC | Age: 22
End: 2022-12-06
Payer: COMMERCIAL

## 2022-12-06 ENCOUNTER — LAB VISIT (OUTPATIENT)
Dept: LAB | Facility: HOSPITAL | Age: 22
End: 2022-12-06
Attending: INTERNAL MEDICINE
Payer: COMMERCIAL

## 2022-12-06 VITALS
SYSTOLIC BLOOD PRESSURE: 100 MMHG | WEIGHT: 117.94 LBS | BODY MASS INDEX: 22.27 KG/M2 | TEMPERATURE: 98 F | HEART RATE: 75 BPM | OXYGEN SATURATION: 96 % | HEIGHT: 61 IN | DIASTOLIC BLOOD PRESSURE: 66 MMHG

## 2022-12-06 DIAGNOSIS — F90.9 ATTENTION DEFICIT HYPERACTIVITY DISORDER (ADHD), UNSPECIFIED ADHD TYPE: ICD-10-CM

## 2022-12-06 DIAGNOSIS — E55.9 HYPOVITAMINOSIS D: ICD-10-CM

## 2022-12-06 DIAGNOSIS — E83.39: ICD-10-CM

## 2022-12-06 DIAGNOSIS — N92.6 MENSTRUAL IRREGULARITY: ICD-10-CM

## 2022-12-06 DIAGNOSIS — R73.09 DYSGLYCEMIA: ICD-10-CM

## 2022-12-06 DIAGNOSIS — R82.994 HYPERCALCIURIA: ICD-10-CM

## 2022-12-06 DIAGNOSIS — M85.80 OSTEOPENIA, UNSPECIFIED LOCATION: ICD-10-CM

## 2022-12-06 DIAGNOSIS — E83.39 ADULT HYPOPHOSPHATASIA: Primary | ICD-10-CM

## 2022-12-06 DIAGNOSIS — E83.39 ADULT HYPOPHOSPHATASIA: ICD-10-CM

## 2022-12-06 DIAGNOSIS — G47.10 HYPERSOMNOLENCE: ICD-10-CM

## 2022-12-06 DIAGNOSIS — F32.A DEPRESSION, UNSPECIFIED DEPRESSION TYPE: ICD-10-CM

## 2022-12-06 LAB
25(OH)D3+25(OH)D2 SERPL-MCNC: 28 NG/ML (ref 30–96)
ALBUMIN SERPL BCP-MCNC: 5.1 G/DL (ref 3.5–5.2)
ALP SERPL-CCNC: 27 U/L (ref 55–135)
ALT SERPL W/O P-5'-P-CCNC: 9 U/L (ref 10–44)
ANION GAP SERPL CALC-SCNC: 11 MMOL/L (ref 8–16)
AST SERPL-CCNC: 15 U/L (ref 10–40)
BILIRUB SERPL-MCNC: 0.6 MG/DL (ref 0.1–1)
BUN SERPL-MCNC: 9 MG/DL (ref 6–20)
CA-I BLDV-SCNC: 1.25 MMOL/L (ref 1.06–1.42)
CALCIUM SERPL-MCNC: 10.2 MG/DL (ref 8.7–10.5)
CHLORIDE SERPL-SCNC: 105 MMOL/L (ref 95–110)
CO2 SERPL-SCNC: 25 MMOL/L (ref 23–29)
CREAT SERPL-MCNC: 0.8 MG/DL (ref 0.5–1.4)
EST. GFR  (NO RACE VARIABLE): >60 ML/MIN/1.73 M^2
ESTIMATED AVG GLUCOSE: 88 MG/DL (ref 68–131)
GLUCOSE SERPL-MCNC: 75 MG/DL (ref 70–110)
HBA1C MFR BLD: 4.7 % (ref 4–5.6)
MAGNESIUM SERPL-MCNC: 2.1 MG/DL (ref 1.6–2.6)
PHOSPHATE SERPL-MCNC: 3.8 MG/DL (ref 2.7–4.5)
POTASSIUM SERPL-SCNC: 3.6 MMOL/L (ref 3.5–5.1)
PROT SERPL-MCNC: 7.6 G/DL (ref 6–8.4)
PTH-INTACT SERPL-MCNC: 53.4 PG/ML (ref 9–77)
SODIUM SERPL-SCNC: 141 MMOL/L (ref 136–145)

## 2022-12-06 PROCEDURE — 36415 COLL VENOUS BLD VENIPUNCTURE: CPT | Mod: PO | Performed by: INTERNAL MEDICINE

## 2022-12-06 PROCEDURE — 99214 OFFICE O/P EST MOD 30 MIN: CPT | Mod: S$GLB,,, | Performed by: INTERNAL MEDICINE

## 2022-12-06 PROCEDURE — 3078F PR MOST RECENT DIASTOLIC BLOOD PRESSURE < 80 MM HG: ICD-10-PCS | Mod: CPTII,S$GLB,, | Performed by: INTERNAL MEDICINE

## 2022-12-06 PROCEDURE — 83970 ASSAY OF PARATHORMONE: CPT | Performed by: INTERNAL MEDICINE

## 2022-12-06 PROCEDURE — 3044F HG A1C LEVEL LT 7.0%: CPT | Mod: CPTII,S$GLB,, | Performed by: INTERNAL MEDICINE

## 2022-12-06 PROCEDURE — 1160F PR REVIEW ALL MEDS BY PRESCRIBER/CLIN PHARMACIST DOCUMENTED: ICD-10-PCS | Mod: CPTII,S$GLB,, | Performed by: INTERNAL MEDICINE

## 2022-12-06 PROCEDURE — 3074F PR MOST RECENT SYSTOLIC BLOOD PRESSURE < 130 MM HG: ICD-10-PCS | Mod: CPTII,S$GLB,, | Performed by: INTERNAL MEDICINE

## 2022-12-06 PROCEDURE — 83036 HEMOGLOBIN GLYCOSYLATED A1C: CPT | Performed by: INTERNAL MEDICINE

## 2022-12-06 PROCEDURE — 3044F PR MOST RECENT HEMOGLOBIN A1C LEVEL <7.0%: ICD-10-PCS | Mod: CPTII,S$GLB,, | Performed by: INTERNAL MEDICINE

## 2022-12-06 PROCEDURE — 3008F PR BODY MASS INDEX (BMI) DOCUMENTED: ICD-10-PCS | Mod: CPTII,S$GLB,, | Performed by: INTERNAL MEDICINE

## 2022-12-06 PROCEDURE — 1159F MED LIST DOCD IN RCRD: CPT | Mod: CPTII,S$GLB,, | Performed by: INTERNAL MEDICINE

## 2022-12-06 PROCEDURE — 1159F PR MEDICATION LIST DOCUMENTED IN MEDICAL RECORD: ICD-10-PCS | Mod: CPTII,S$GLB,, | Performed by: INTERNAL MEDICINE

## 2022-12-06 PROCEDURE — 1160F RVW MEDS BY RX/DR IN RCRD: CPT | Mod: CPTII,S$GLB,, | Performed by: INTERNAL MEDICINE

## 2022-12-06 PROCEDURE — 3074F SYST BP LT 130 MM HG: CPT | Mod: CPTII,S$GLB,, | Performed by: INTERNAL MEDICINE

## 2022-12-06 PROCEDURE — 82330 ASSAY OF CALCIUM: CPT | Performed by: INTERNAL MEDICINE

## 2022-12-06 PROCEDURE — 84100 ASSAY OF PHOSPHORUS: CPT | Performed by: INTERNAL MEDICINE

## 2022-12-06 PROCEDURE — 83735 ASSAY OF MAGNESIUM: CPT | Performed by: INTERNAL MEDICINE

## 2022-12-06 PROCEDURE — 99999 PR PBB SHADOW E&M-EST. PATIENT-LVL IV: CPT | Mod: PBBFAC,,, | Performed by: INTERNAL MEDICINE

## 2022-12-06 PROCEDURE — 80053 COMPREHEN METABOLIC PANEL: CPT | Performed by: INTERNAL MEDICINE

## 2022-12-06 PROCEDURE — 3008F BODY MASS INDEX DOCD: CPT | Mod: CPTII,S$GLB,, | Performed by: INTERNAL MEDICINE

## 2022-12-06 PROCEDURE — 3078F DIAST BP <80 MM HG: CPT | Mod: CPTII,S$GLB,, | Performed by: INTERNAL MEDICINE

## 2022-12-06 PROCEDURE — 82306 VITAMIN D 25 HYDROXY: CPT | Performed by: INTERNAL MEDICINE

## 2022-12-06 PROCEDURE — 99999 PR PBB SHADOW E&M-EST. PATIENT-LVL IV: ICD-10-PCS | Mod: PBBFAC,,, | Performed by: INTERNAL MEDICINE

## 2022-12-06 PROCEDURE — 99214 PR OFFICE/OUTPT VISIT, EST, LEVL IV, 30-39 MIN: ICD-10-PCS | Mod: S$GLB,,, | Performed by: INTERNAL MEDICINE

## 2022-12-06 RX ORDER — ASFOTASE ALFA 80 MG/.8ML
80 SOLUTION SUBCUTANEOUS
Qty: 36 EACH | Refills: 3 | Status: SHIPPED | OUTPATIENT
Start: 2022-12-07 | End: 2023-07-18

## 2022-12-06 RX ORDER — KETOROLAC TROMETHAMINE 10 MG/1
10 TABLET, FILM COATED ORAL EVERY 6 HOURS PRN
COMMUNITY
Start: 2022-08-27 | End: 2023-07-18

## 2022-12-06 NOTE — TELEPHONE ENCOUNTER
States it is specialty drug that has to be sent to a specialty pharmacy; cannot fill. Send to [facility]    Verbal order [facility] to [facility] at [facility] [place], [place] - [address] [place] [address] for:  Medication  asfotase tray (STRENSIQ) 80 mg/0.8 mL Soln [[id number]]  Outpatient Medication Detail     Disp Refills Start End GUILHERME   asfotase tray (STRENSIQ) 80 mg/0.8 mL Soln 12 each 11 12/7/2022 3/7/2023 No   Sig - Route: Inject 80 mg into the skin 3 (three) times a week. - Subcutaneous   Sent to pharmacy as: asfotase tray (STRENSIQ) 80 mg/0.8 mL Soln   Class: Normal   Order: [id number]   Date/Time Signed: 12/6/2022 14:49       E-Prescribing Status: Receipt confirmed by pharmacy (12/6/2022  2:49 PM CST)   No prior authorization was found for this prescription.   Found prior authorization for another prescription for the same medication: Payer Waiting for Response        Associated Diagnoses    Adult hypophosphatasia  - Primary

## 2022-12-06 NOTE — PROGRESS NOTES
Subjective:      Patient ID: Munira Ocasio is a 22 y.o. female.    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   History of Present Illness    The patient, Munira Ocasio is a 20 yr old lady seen in [facility] today on account of possible adult hypophatasia.  Her mother ((Rafaela Ocasio - MRN; [id number])) is a clinic patient as well.      Her background comorbidities are as detailed below;  Patient Active Problem List   Diagnosis    Attention deficit disorder    Hypophosphatasia    Low serum alkaline phosphatase    Hypovitaminosis D    Sleep deprivation    Hypersomnolent    Menstrual irregularity    Hypercalciuria    Osteopenia    Renal phosphaturia    Adult hypophosphatasia     Patients baseline [place] is 22. Patient has never had a sleep study.   Patient complains of chronic fatigue over last 6 yrs.  Patient is hypersomnolent. She sleeps ~ 8-12hrs daily. She goes to sleep early.  Patient wakes up feeling tired. She is a deep sleeper and not restless during sleep.  She has a lot of day time somnolence. She has a lot of early morning headaches.  She does not dream frequently.    Periods; irregular. 5-8/ she often skips periods She had periods once every 6months last year  Menarche; 17yrs. She has always had irregular periods.  She has had 1-2 periods thus far this year.   Grav 0.  Patient does not smoke, Patient drinks ETOH very irregularly.  Patient just graduated from high school.    Has menorrhagia++ and dysmenorrhea.. She has some hirsutism.  She had significant facial, back and chest acneiform eruptions.    Patient has never had any fractures. She does not have frequent dental problems. She did loss her early front teeth early when young.    She has generalized myalgias and arthralgias which are long standing.  She is having generalized body and bone  aches and associated fatigue.  Patient has not even started taking the injections for strensiq yet.          Review of Systems   Constitutional:  Positive for fatigue (chronic). Negative for activity change, appetite change, chills and diaphoresis.   HENT:  Negative for facial swelling, hearing loss, sinus pressure, sore throat, trouble swallowing and voice change.    Eyes:  Negative for photophobia and visual disturbance.   Respiratory:  Negative for apnea, cough, chest tightness, shortness of breath, wheezing and stridor.    Cardiovascular:  Negative for chest pain, palpitations and leg swelling.   Gastrointestinal:  Negative for abdominal distention, abdominal pain, constipation, diarrhea, nausea and vomiting.   Endocrine: Negative for cold intolerance, heat intolerance, polydipsia, polyphagia and polyuria.   Genitourinary:  Positive for menstrual problem (long standing menstrual irregularity as detailed above.). Negative for difficulty urinating, dysuria, flank pain, frequency, hematuria and urgency.   Musculoskeletal:  Positive for arthralgias (multiple joints; mainly of weight bearing joints.) and myalgias (chronic). Negative for back pain, gait problem, joint swelling, neck pain and neck stiffness.   Skin:  Negative for color change, pallor and rash.   Neurological:  Positive for weakness (intermittent). Negative for dizziness, tremors, seizures, syncope, light-headedness, numbness and headaches.   Hematological:  Does not bruise/bleed easily.   Psychiatric/Behavioral:  Positive for sleep disturbance (As detailed above.). Negative for agitation, confusion, dysphoric mood and hallucinations. The patient is not nervous/anxious.      Objective: Nil video televisit.         Lab Review:      Latest Reference Range & Units 05/06/22 08:13 05/11/22 09:23 05/11/22 09:41 05/11/22 14:48   WBC 3.90 - 12.70 K/uL   7.08    RBC 4.00 - 5.40 M/uL   4.90    HEMOGLOBIN 12.0 - 16.0 g/dL   14.0    HEMATOCRIT 37.0 - 48.5 %   42.9     MCV 82 - 98 fL   88    MCH 27.0 - 31.0 pg   28.6    MCHC 32.0 - 36.0 g/dL   32.6    RDW 11.5 - 14.5 %   12.3    Platelets 150 - 450 K/uL   294    MPV 9.2 - 12.9 fL   9.5    Gran % 38.0 - 73.0 %   47.4    Lymph % 18.0 - 48.0 %   42.5    Mono % 4.0 - 15.0 %   6.2    Eosinophil % 0.0 - 8.0 %   1.1    Basophil % 0.0 - 1.9 %   0.7    Immature Granulocytes 0.0 - 0.5 %   2.1 (H)    Gran # (ANC) 1.8 - 7.7 K/uL   3.4    Lymph # 1.0 - 4.8 K/uL   3.0    Mono # 0.3 - 1.0 K/uL   0.4    Eos # 0.0 - 0.5 K/uL   0.1    Baso # 0.00 - 0.20 K/uL   0.05    Immature Grans (Abs) 0.00 - 0.04 K/uL   0.15 (H)    nRBC 0 /100 WBC   0    Differential Method    Automated    Sodium 136 - 145 mmol/L   139    Potassium 3.5 - 5.1 mmol/L   4.0    Chloride 95 - 110 mmol/L   106    CO2 23 - 29 mmol/L   24    ANION GAP 8 - 16 mmol/L   9    BUN 6 - 20 mg/dL   11    Creatinine 0.5 - 1.4 mg/dL   0.7    eGFR if non African American >60 mL/min/1.73 m^2   >60.0    eGFR if African American >60 mL/min/1.73 m^2   >60.0    Glucose 70 - 110 mg/dL   83    Calcium 8.7 - 10.5 mg/dL   9.6    Ionized Calcium 1.06 - 1.42 mmol/L   1.25    Phosphorus 2.7 - 4.5 mg/dL   4.9 (H)    Magnesium 1.6 - 2.6 mg/dL   2.1    Alkaline Phosphatase 55 - 135 U/L   26 (L)    Alkaline Phosphatase, Total 36 - 128 U/L   25 (L)    Alkaline Phos Bone Specific 4.7 - 17.8 ug/L   5.5    PROTEIN TOTAL 6.0 - 8.4 g/dL   6.9    Albumin 3.5 - 5.2 g/dL   4.1    Albumin 3.6 - 5.1 g/dL   4.3    Uric Acid 2.4 - 5.7 mg/dL   4.6    BILIRUBIN TOTAL 0.1 - 1.0 mg/dL   0.4    AST 10 - 40 U/L   20    ALT 10 - 44 U/L   15    Cholesterol 120 - 199 mg/dL   148    HDL 40 - 75 mg/dL   58    HDL/Cholesterol Ratio 20.0 - 50.0 %   39.2    LDL Cholesterol External 63.0 - 159.0 mg/dL   82.2    Non-HDL Cholesterol mg/dL   90    Total Cholesterol/HDL Ratio 2.0 - 5.0    2.6    Triglycerides 30 - 150 mg/dL   39    Intestine, Alk Phos 1 - 24 %   Test Not Performed    Liver, Alk Phos 25 - 69 %   Test Not Performed     PLACENTAL ISOENZYME 0 %   Test Not Performed    Macrohepatic Isoenzyme 0 %   Test Not Performed    Alkaline Phosphatase Isoenzyme Interpretation    Test Not Performed    Vit D, 25-Hydroxy 30 - 96 ng/mL   26 (L)    Vitamin B6 5 - 50 ug/L   83 (H)    Hemoglobin A1C External 4.0 - 5.6 %   4.8    Estimated Avg Glucose 68 - 131 mg/dL   91    TSH 0.400 - 4.000 uIU/mL   0.842    PTH 9.0 - 77.0 pg/mL   57.7    Dihydrotestosterone pg/mL   80    Estradiol See Text pg/mL   28    Estrogen pg/mL   61    FSH See Text mIU/mL   6.04    LH See Text mIU/mL   14.2    Progesterone See Text ng/mL   0.4    Prolactin 5.2 - 26.5 ng/mL   12.4    Sex Hormone Binding Globulin 17 - 124 nmol/L   36    Testosterone 2 - 45 ng/dL   18    Testosterone, Bioavailable 0.5 - 8.5 ng/dL   3.9    Testosterone, Free 0.2 - 5.0 pg/mL   2.0    Genetic counseling?    No    [place] Specimen Type    Blood    Miscellaneous Genetic Test Name    See BELOW    Parental or Sibling Testing?    No    Miscellaneous Test Name   [facility] [address] amino acid quant     Test Result   See result image under hyperlink     Specimen Type   Urine     Reference Lab   SEE COMMENT SEE COMMENT    Test Result    See result image under hyperlink    Bone, Alk Phos 28 - 66 %   Test Not Performed    XR CHEST PA AND LATERAL  Rpt      US PELVIS COMPLETE NON OB     Rpt   [place] Miscellaneous Result   SEE COMMENTS SEE COMMENTS    (H): Data is abnormally high  (L): Data is abnormally low  Rpt: View report in Results Review for more information      Assessment:     1. Adult hypophosphatasia  asfotase tray (STRENSIQ) 80 mg/0.8 mL Soln    Comprehensive Metabolic Panel      2. Hypovitaminosis D  Calcium, Ionized    Vitamin D    PTH, Intact      3. Osteopenia, unspecified location  Comprehensive Metabolic Panel    Magnesium    Phosphorus      4. Hypercalciuria  Phosphorus, Random Urine    Calcium, Random Urine    Creatinine, Random Urine    Hemoglobin A1C    Microalbumin/Creatinine Ratio, Urine     Urinalysis    Misc Sendout Test, Non-Blood Amino acid analysis and quantification (urine)      5. Renal phosphaturia  Phosphorus, Random Urine    Calcium, Random Urine    Creatinine, Random Urine    Hemoglobin A1C    Microalbumin/Creatinine Ratio, Urine    Urinalysis    Misc Sendout Test, Non-Blood Amino acid analysis and quantification (urine)      6. Attention deficit hyperactivity disorder (ADHD), unspecified ADHD type        7. Menstrual irregularity  Ambulatory referral/consult to Obstetrics / Gynecology      8. Depression, unspecified depression type        9. Dysglycemia  Hemoglobin A1C      10. Hypersomnolence  Polysomnogram (CPAP will be added if patient meets diagnostic criteria.)           Regarding low alkaline phosphatase levels; Given the patients history and her associated sympotomatology and +ve family history it does appear that she has adult hypophosphatasia. To obtain confirmatory labs as detailed above and once diagnosis is confirmed will arrange to commence repletion therapy with alpha asofatase (strensiq). sienna was not able to get the definitive lab testing done @ her last visit.  Regarding ADHD; presently of amphetamine therapy for now.  Regarding menstrual irregularity with mild hirsutism;  To retrieve recent pelvic USS results and obtain relevant labs as detailed above.  Regarding chronic sleep deprivation; to obtain formal sleep study to evaluate patient on account of hypersomnolence, non restorative sleep and chronic fatigue. She describes some episodes suggestive of possible cataplexy and possible night terrors. To urgently obtain sleep study to ensure she does not have untreated parasomia like narcolepsy.  Regarding possible hypovitaminosis D; to check 25 OH vit D levels and replete as needed.  Regarding menstrual irregularities; to recheck basic ovarian functional testing and obtain pelvic ultrasound.  Her recent pelvic USS shows a bocornuate uterus. To refer for formal OBGYN  consultation.       Plan:     FFup in 6 mths

## 2022-12-07 ENCOUNTER — PROCEDURE VISIT (OUTPATIENT)
Dept: SLEEP MEDICINE | Facility: HOSPITAL | Age: 22
End: 2022-12-07
Attending: INTERNAL MEDICINE
Payer: COMMERCIAL

## 2022-12-07 DIAGNOSIS — G47.10 HYPERSOMNOLENCE: ICD-10-CM

## 2022-12-07 PROCEDURE — 95810 POLYSOM 6/> YRS 4/> PARAM: CPT

## 2022-12-08 ENCOUNTER — OFFICE VISIT (OUTPATIENT)
Dept: OBSTETRICS AND GYNECOLOGY | Facility: CLINIC | Age: 22
End: 2022-12-08
Payer: COMMERCIAL

## 2022-12-08 VITALS
WEIGHT: 117.06 LBS | HEIGHT: 61 IN | BODY MASS INDEX: 22.1 KG/M2 | RESPIRATION RATE: 16 BRPM | DIASTOLIC BLOOD PRESSURE: 56 MMHG | SYSTOLIC BLOOD PRESSURE: 92 MMHG

## 2022-12-08 DIAGNOSIS — Q51.3 BICORNUATE UTERUS: ICD-10-CM

## 2022-12-08 DIAGNOSIS — Z71.85 HPV VACCINE COUNSELING: ICD-10-CM

## 2022-12-08 DIAGNOSIS — N92.6 MENSTRUAL IRREGULARITY: ICD-10-CM

## 2022-12-08 DIAGNOSIS — R10.2 PELVIC PAIN: Primary | ICD-10-CM

## 2022-12-08 PROCEDURE — 3074F SYST BP LT 130 MM HG: CPT | Mod: CPTII,S$GLB,, | Performed by: OBSTETRICS & GYNECOLOGY

## 2022-12-08 PROCEDURE — 99999 PR PBB SHADOW E&M-EST. PATIENT-LVL V: CPT | Mod: PBBFAC,,, | Performed by: OBSTETRICS & GYNECOLOGY

## 2022-12-08 PROCEDURE — 3044F HG A1C LEVEL LT 7.0%: CPT | Mod: CPTII,S$GLB,, | Performed by: OBSTETRICS & GYNECOLOGY

## 2022-12-08 PROCEDURE — 99204 OFFICE O/P NEW MOD 45 MIN: CPT | Mod: S$GLB,,, | Performed by: OBSTETRICS & GYNECOLOGY

## 2022-12-08 PROCEDURE — 1159F PR MEDICATION LIST DOCUMENTED IN MEDICAL RECORD: ICD-10-PCS | Mod: CPTII,S$GLB,, | Performed by: OBSTETRICS & GYNECOLOGY

## 2022-12-08 PROCEDURE — 3044F PR MOST RECENT HEMOGLOBIN A1C LEVEL <7.0%: ICD-10-PCS | Mod: CPTII,S$GLB,, | Performed by: OBSTETRICS & GYNECOLOGY

## 2022-12-08 PROCEDURE — 1159F MED LIST DOCD IN RCRD: CPT | Mod: CPTII,S$GLB,, | Performed by: OBSTETRICS & GYNECOLOGY

## 2022-12-08 PROCEDURE — 99999 PR PBB SHADOW E&M-EST. PATIENT-LVL V: ICD-10-PCS | Mod: PBBFAC,,, | Performed by: OBSTETRICS & GYNECOLOGY

## 2022-12-08 PROCEDURE — 3078F DIAST BP <80 MM HG: CPT | Mod: CPTII,S$GLB,, | Performed by: OBSTETRICS & GYNECOLOGY

## 2022-12-08 PROCEDURE — 3078F PR MOST RECENT DIASTOLIC BLOOD PRESSURE < 80 MM HG: ICD-10-PCS | Mod: CPTII,S$GLB,, | Performed by: OBSTETRICS & GYNECOLOGY

## 2022-12-08 PROCEDURE — 3074F PR MOST RECENT SYSTOLIC BLOOD PRESSURE < 130 MM HG: ICD-10-PCS | Mod: CPTII,S$GLB,, | Performed by: OBSTETRICS & GYNECOLOGY

## 2022-12-08 PROCEDURE — 3008F BODY MASS INDEX DOCD: CPT | Mod: CPTII,S$GLB,, | Performed by: OBSTETRICS & GYNECOLOGY

## 2022-12-08 PROCEDURE — 99204 PR OFFICE/OUTPT VISIT, NEW, LEVL IV, 45-59 MIN: ICD-10-PCS | Mod: S$GLB,,, | Performed by: OBSTETRICS & GYNECOLOGY

## 2022-12-08 PROCEDURE — 3008F PR BODY MASS INDEX (BMI) DOCUMENTED: ICD-10-PCS | Mod: CPTII,S$GLB,, | Performed by: OBSTETRICS & GYNECOLOGY

## 2022-12-08 NOTE — PATIENT INSTRUCTIONS
NPSG was performed and pt was informed to call her referring physician in 3 to 5 business days to make a f/u appt.

## 2022-12-08 NOTE — PROGRESS NOTES
Narrative    REASON FOR EXAM: pelvic pain, hx of fibroid     TECHNICAL FACTORS: B-mode and Doppler sonographic images were obtained of the pelvis in the transabdominal and transvaginal approach(es).     COMPARISON: None     FINDINGS: Uterus measures 6.6 x 4.4 x 2.8 cm. No fibroids identified. Uterine anomaly is suspected, possibly bicornuate uterus. Endometrial thickness measures 0.9 cm. Normal ovaries without torsion. Trace fluid within the cul-de-sac.     IMPRESSION:   No acute findings.   Uterine anomaly is suspected, possibly bicornuate uterus. Recommend elective outpatient pelvic MRI.         Electronically signed by Yousuf Berg MD on 8/27/2022 2:02 PM

## 2022-12-08 NOTE — PROGRESS NOTES
"  Subjective:   Chief Complaint:  Establish Care (ER visit 22 for abdominal pain)       Patient ID: Munira Ocasio is a  22 y.o. female.    Contraception:  None    Date: 2022    The patient presents today due to the following:  She was referred by her primary care physician due to radiologic studies noted on ER evaluation in August.  The patient reports being seen in the hospital due to pelvic pain and was diagnosed with a urinary tract infection.  During her ER evaluation she was told that she had an "abnormal uterus".    Menarche was approximately age 17.  The patient did use OCPs in high school to help regulate her menstrual cycle but stopped due to increasing dysmenorrhea.  She reports that she has never had regular menses.  They tend to be heavy irregular with significant dysmenorrhea.    GYN & OB History  No LMP recorded (lmp unknown).     Date of Last Pap: No result found    OB History    Para Term  AB Living   0 0 0 0 0 0   SAB IAB Ectopic Multiple Live Births   0 0 0 0 0         Past Medical History:   Diagnosis Date    ADHD (attention deficit hyperactivity disorder)     Anxiety     Depression         History reviewed. No pertinent surgical history.     Review of patient's allergies indicates:  No Known Allergies     Medications    Current Outpatient Medications:     busPIRone (BUSPAR) 5 MG Tab, , Disp: , Rfl:     albuterol-ipratropium (DUO-NEB) 2.5 mg-0.5 mg/3 mL nebulizer solution, Take 3 mLs by nebulization every 6 (six) hours as needed for Wheezing. Rescue (Patient not taking: Reported on 2022), Disp: 75 mL, Rfl: 2    asfotase tray (STRENSIQ) 80 mg/0.8 mL Soln, Inject 80 mg into the skin 3 (three) times a week. (Patient not taking: Reported on 2022), Disp: 36 each, Rfl: 3    azithromycin (Z-MATILDA) 250 MG tablet, Take 250 mg by mouth., Disp: , Rfl:     FLUoxetine 10 MG capsule, Take 10 mg by mouth once daily., Disp: , Rfl:     FLUoxetine 20 MG capsule, Take 20 mg " by mouth once daily., Disp: , Rfl:     ketorolac (TORADOL) 10 mg tablet, Take 10 mg by mouth every 6 (six) hours as needed., Disp: , Rfl:     loratadine (CLARITIN) 10 mg tablet, Take 10 mg by mouth once daily., Disp: , Rfl:     methylphenidate (RITALIN) 5 MG tablet, Take 5 mg by mouth 2 (two) times daily., Disp: , Rfl:     promethazine-dextromethorphan (PROMETHAZINE-DM) 6.25-15 mg/5 mL Syrp, Take by mouth., Disp: , Rfl:     XOFLUZA 40 mg tablet, Take by mouth daily as needed., Disp: , Rfl:        Review of Systems (at today's evaluation)  Review of Systems   Constitutional:  Negative for fever and unexpected weight change.   HENT: Negative.     Respiratory:  Negative for cough and shortness of breath.    Cardiovascular:  Negative for chest pain.   Gastrointestinal:  Negative for abdominal pain, nausea and vomiting.   Genitourinary:  Negative for dysuria and urgency.          Gyn as per HPI   Musculoskeletal:  Negative for myalgias.   Integumentary:  Negative for rash.   Neurological: Negative.  Negative for headaches.   Breast: negative.       Objective:      Vitals:    12/08/22 1334   BP: (!) 92/56   Resp: 16     Physical Exam:   Constitutional: She appears well-developed and well-nourished.    HENT:   Head: Normocephalic.     Neck: No thyroid mass present.    Cardiovascular:  Normal rate, regular rhythm and normal heart sounds.             Pulmonary/Chest: Effort normal and breath sounds normal.        Abdominal: Soft. There is no abdominal tenderness.     Genitourinary:    Inguinal canal, vagina, uterus, right adnexa and left adnexa normal.      Pelvic exam was performed with patient supine.   The external female genitalia was normal.   No external genitalia lesions identified,Cervix is normal. Right adnexum displays no mass and no tenderness. Left adnexum displays no mass and no tenderness. No tenderness or bleeding in the vagina. Uterus is not tender. Normal urethral meatus.Urethra findings: no  tendernessBladder findings: no bladder tenderness          Musculoskeletal:      Right lower leg: No edema.      Left lower leg: No edema.      Lymphadenopathy:     She has no cervical adenopathy. No inguinal adenopathy noted on the right or left side.    Neurological: She is alert.    Skin: Skin is warm and dry.    Psychiatric: Mood normal.       Results    Collected Updated Procedure    05/11/2022 0941 05/12/2022 0016 Luteinizing Hormone [[id number]]   Blood    Component Value Units   LH 14.2  mIU/mL          05/11/2022 0941 05/20/2022 1603 Dihydrotestosterone [[id number]]   Blood    Component Value Units   Dihydrotestosterone 80  pg/mL          05/11/2022 0941 05/12/2022 0016 Prolactin [[id number]]   Blood    Component Value Units   Prolactin 12.4 ng/mL          05/11/2022 0941 05/12/2022 0016 Progesterone [[id number]]   Blood    Component Value Units   Progesterone 0.4  ng/mL          05/11/2022 0941 05/16/2022 1630 Estrogens, Total [[id number]]   Blood    Component Value Units   Estrogen 61  pg/mL          05/11/2022 0941 05/12/2022 0016 Follicle Stimulating Hormone [[id number]]   Blood    Component Value Units   Follicle Stimulating Hormone 6.04  mIU/mL          05/11/2022 0941 05/12/2022 0016 Estradiol [[id number]]   Blood    Component Value Units   Estradiol 28  pg/mL          05/11/2022 0941 05/17/2022 1525 Testosterone Panel [[id number]]   Blood    Component Value Units   Testosterone 18  ng/dL   Testosterone, Free 2.0 pg/mL   Testosterone, Bioavailable 3.9 ng/dL   Sex Hormone Binding Globulin 36 nmol/L   Albumin 4.3 g/dL             [facility]  Outside Information     US Non-OB Transvaginal    Anatomical Region Laterality Modality   Pelvis -- Ultrasound     Narrative    REASON FOR EXAM: pelvic pain, hx of fibroid     TECHNICAL FACTORS: B-mode and Doppler sonographic images were obtained of the pelvis in the transabdominal and transvaginal approach(es).     COMPARISON: None     FINDINGS: Uterus  measures 6.6 x 4.4 x 2.8 cm. No fibroids identified. Uterine anomaly is suspected, possibly bicornuate uterus. Endometrial thickness measures 0.9 cm. Normal ovaries without torsion. Trace fluid within the cul-de-sac.     IMPRESSION:   No acute findings.   Uterine anomaly is suspected, possibly bicornuate uterus. Recommend elective outpatient pelvic MRI.     Electronically signed by Yousuf Berg MD on 8/27/2022 2:02 PM     5/11/2022 Routine     Narrative & Impression  EXAMINATION:  US PELVIS COMPLETE NON OB     CLINICAL HISTORY:  Patient with irregular periods; multiple skipped periods.; Irregular menstruation, unspecified     TECHNIQUE:  Transabdominal sonography of the pelvis was performed,     COMPARISON:  None.     FINDINGS:  Uterus:     Size: 4.9 x 4 cm     Masses: None     Endometrium: Normal in this pre menopausal patient, measuring 5 mm.     Right ovary:     Size: 2.8 x 2.7 cm     Appearance: Normal     Vascular flow: Normal.     Left ovary:     Size: 2.8 x 3.1 cm     Appearance: Normal     Vascular Flow: Normal.     Free Fluid:     None.     Impression:     No sonographic abnormality.      Electronically signed by: Pedro Hendrix MD  Date:                                            05/11/2022  Time:                                           15:13    Assessment:        1. Pelvic pain    2. Menstrual irregularity    3. Bicornuate uterus    4. HPV vaccine counseling         Plan:      Pelvic pain    Menstrual irregularity  -     Ambulatory referral/consult to Obstetrics / Gynecology    Bicornuate uterus  Comments:  Possible  Orders:  -     MRI Pelvis W WO Contrast; Future; Expected date: 12/08/2022    HPV vaccine counseling       Follow up for ASAP after recommended testing obtained, Follow-up on today's evaluation.     The above was reviewed discussed with the patient.    We discussed the results of her ER evaluation which was apparently transvaginal as well as her recent follow-up ultrasound which  was transabdominal.  Bicornuate uterus is and potential mullerian abnormalities reviewed discussed.  We discussed the potential role in an mullerian abnormality in her bleeding and dysmenorrhea issues.      At this time will proceed as follows:  1. MRI of the pelvis has been ordered to further evaluate the uterus  2. We will base further evaluation treatment on results of the MRI studies    The patient was provided with GARDASIL 9 vaccine information.  She has already received the GARDASIL human papilloma virus vaccine and therefore does not require it.       The patient's questions were answered, and she is in agreement with the current plan.

## 2022-12-09 PROBLEM — R40.0 SOMNOLENCE, DAYTIME: Status: ACTIVE | Noted: 2022-12-09

## 2022-12-09 PROBLEM — R53.82 CHRONIC FATIGUE: Status: ACTIVE | Noted: 2022-12-09

## 2022-12-21 ENCOUNTER — HOSPITAL ENCOUNTER (OUTPATIENT)
Dept: RADIOLOGY | Facility: HOSPITAL | Age: 22
Discharge: HOME | End: 2022-12-21
Attending: OBSTETRICS & GYNECOLOGY
Payer: COMMERCIAL

## 2022-12-21 DIAGNOSIS — Q51.3 BICORNUATE UTERUS: ICD-10-CM

## 2022-12-21 PROCEDURE — A9585 GADOBUTROL INJECTION: HCPCS | Performed by: OBSTETRICS & GYNECOLOGY

## 2022-12-21 PROCEDURE — 72197 MRI PELVIS W/O & W/DYE: CPT | Mod: 26,,, | Performed by: RADIOLOGY

## 2022-12-21 PROCEDURE — 72197 MRI PELVIS W/O & W/DYE: CPT | Mod: TC

## 2022-12-21 PROCEDURE — 72197 MRI PELVIS W WO CONTRAST: ICD-10-PCS | Mod: 26,,, | Performed by: RADIOLOGY

## 2022-12-21 PROCEDURE — 25500020 PHARM REV CODE 255: Performed by: OBSTETRICS & GYNECOLOGY

## 2022-12-21 RX ORDER — GADOBUTROL 604.72 MG/ML
5 INJECTION INTRAVENOUS
Status: COMPLETED | OUTPATIENT
Start: 2022-12-21 | End: 2022-12-21

## 2022-12-21 RX ADMIN — GADOBUTROL 5 ML: 604.72 INJECTION INTRAVENOUS at 07:12

## 2023-01-03 ENCOUNTER — OFFICE VISIT (OUTPATIENT)
Dept: OBSTETRICS AND GYNECOLOGY | Facility: CLINIC | Age: 23
End: 2023-01-03
Payer: COMMERCIAL

## 2023-01-03 VITALS
BODY MASS INDEX: 22.27 KG/M2 | WEIGHT: 117.94 LBS | SYSTOLIC BLOOD PRESSURE: 112 MMHG | DIASTOLIC BLOOD PRESSURE: 60 MMHG | HEIGHT: 61 IN | RESPIRATION RATE: 16 BRPM

## 2023-01-03 DIAGNOSIS — N92.6 MENSTRUAL IRREGULARITY: Primary | ICD-10-CM

## 2023-01-03 PROCEDURE — 3074F SYST BP LT 130 MM HG: CPT | Mod: CPTII,S$GLB,, | Performed by: OBSTETRICS & GYNECOLOGY

## 2023-01-03 PROCEDURE — 1159F PR MEDICATION LIST DOCUMENTED IN MEDICAL RECORD: ICD-10-PCS | Mod: CPTII,S$GLB,, | Performed by: OBSTETRICS & GYNECOLOGY

## 2023-01-03 PROCEDURE — 99213 OFFICE O/P EST LOW 20 MIN: CPT | Mod: S$GLB,,, | Performed by: OBSTETRICS & GYNECOLOGY

## 2023-01-03 PROCEDURE — 99999 PR PBB SHADOW E&M-EST. PATIENT-LVL III: CPT | Mod: PBBFAC,,, | Performed by: OBSTETRICS & GYNECOLOGY

## 2023-01-03 PROCEDURE — 3078F PR MOST RECENT DIASTOLIC BLOOD PRESSURE < 80 MM HG: ICD-10-PCS | Mod: CPTII,S$GLB,, | Performed by: OBSTETRICS & GYNECOLOGY

## 2023-01-03 PROCEDURE — 3078F DIAST BP <80 MM HG: CPT | Mod: CPTII,S$GLB,, | Performed by: OBSTETRICS & GYNECOLOGY

## 2023-01-03 PROCEDURE — 1159F MED LIST DOCD IN RCRD: CPT | Mod: CPTII,S$GLB,, | Performed by: OBSTETRICS & GYNECOLOGY

## 2023-01-03 PROCEDURE — 3008F PR BODY MASS INDEX (BMI) DOCUMENTED: ICD-10-PCS | Mod: CPTII,S$GLB,, | Performed by: OBSTETRICS & GYNECOLOGY

## 2023-01-03 PROCEDURE — 3008F BODY MASS INDEX DOCD: CPT | Mod: CPTII,S$GLB,, | Performed by: OBSTETRICS & GYNECOLOGY

## 2023-01-03 PROCEDURE — 99999 PR PBB SHADOW E&M-EST. PATIENT-LVL III: ICD-10-PCS | Mod: PBBFAC,,, | Performed by: OBSTETRICS & GYNECOLOGY

## 2023-01-03 PROCEDURE — 99213 PR OFFICE/OUTPT VISIT, EST, LEVL III, 20-29 MIN: ICD-10-PCS | Mod: S$GLB,,, | Performed by: OBSTETRICS & GYNECOLOGY

## 2023-01-03 PROCEDURE — 3074F PR MOST RECENT SYSTOLIC BLOOD PRESSURE < 130 MM HG: ICD-10-PCS | Mod: CPTII,S$GLB,, | Performed by: OBSTETRICS & GYNECOLOGY

## 2023-01-03 NOTE — PROGRESS NOTES
"  Subjective:   Chief Complaint:  Follow-up (MRI follow up)         Patient ID: Munira Ocasio is a  22 y.o. female.    Contraception:  None (SSSP)    Date: 2022    The patient presents today due to the following:  She was referred by her primary care physician due to radiologic studies noted on ER evaluation in August.  The patient reports being seen in the hospital due to pelvic pain and was diagnosed with a urinary tract infection.  During her ER evaluation she was told that she had an "abnormal uterus".    Menarche was approximately age 17.  The patient did use OCPs in high school to help regulate her menstrual cycle but stopped due to increasing dysmenorrhea.  She reports that she has never had regular menses.  They tend to be heavy irregular with significant dysmenorrhea.    Date: 1/3/2023    The patient presents today for follow-up.  She was last seen as above.  In light of the concerns of a possible bicornuate/abnormal uterus on ultrasound during the ER evaluation a pelvic MRI was ordered.  The patient presents today to discuss the results.    GYN & OB History  Patient's last menstrual period was 2022 (exact date).     Date of Last Pap: No result found    OB History    Para Term  AB Living   0 0 0 0 0 0   SAB IAB Ectopic Multiple Live Births   0 0 0 0 0         Past Medical History:   Diagnosis Date    ADHD (attention deficit hyperactivity disorder)     Anxiety     Depression         History reviewed. No pertinent surgical history.     Review of patient's allergies indicates:  No Known Allergies     Medications    Current Outpatient Medications:     albuterol-ipratropium (DUO-NEB) 2.5 mg-0.5 mg/3 mL nebulizer solution, Take 3 mLs by nebulization every 6 (six) hours as needed for Wheezing. Rescue (Patient not taking: Reported on 2022), Disp: 75 mL, Rfl: 2    asfotase tray (STRENSIQ) 80 mg/0.8 mL Soln, Inject 80 mg into the skin 3 (three) times a week. (Patient not " taking: Reported on 12/8/2022), Disp: 36 each, Rfl: 3    azithromycin (Z-MATILDA) 250 MG tablet, Take 250 mg by mouth., Disp: , Rfl:     busPIRone (BUSPAR) 5 MG Tab, , Disp: , Rfl:     FLUoxetine 10 MG capsule, Take 10 mg by mouth once daily., Disp: , Rfl:     FLUoxetine 20 MG capsule, Take 20 mg by mouth once daily., Disp: , Rfl:     ketorolac (TORADOL) 10 mg tablet, Take 10 mg by mouth every 6 (six) hours as needed., Disp: , Rfl:     loratadine (CLARITIN) 10 mg tablet, Take 10 mg by mouth once daily., Disp: , Rfl:     methylphenidate (RITALIN) 5 MG tablet, Take 5 mg by mouth 2 (two) times daily., Disp: , Rfl:     promethazine-dextromethorphan (PROMETHAZINE-DM) 6.25-15 mg/5 mL Syrp, Take by mouth., Disp: , Rfl:     XOFLUZA 40 mg tablet, Take by mouth daily as needed., Disp: , Rfl:        Review of Systems (at today's evaluation)  Review of Systems   Constitutional:  Negative for fever and unexpected weight change.   HENT: Negative.     Respiratory:  Negative for cough and shortness of breath.    Cardiovascular:  Negative for chest pain.   Gastrointestinal:  Negative for abdominal pain, nausea and vomiting.   Genitourinary:  Negative for dysuria and urgency.          Gyn as per HPI   Musculoskeletal:  Negative for myalgias.   Integumentary:  Negative for rash.   Neurological: Negative.  Negative for headaches.   Breast: negative.       Objective:      Vitals:    01/03/23 1353   BP: 112/60   Resp: 16     Physical Exam:   Constitutional: She appears well-developed and well-nourished.    HENT:   Head: Normocephalic.     Neck: No thyroid mass and no thyromegaly present.    Cardiovascular:  Normal rate.             Pulmonary/Chest: Effort normal. No respiratory distress.        Abdominal: Soft. There is no abdominal tenderness.             Musculoskeletal: Normal range of motion.      Right lower leg: No edema.      Left lower leg: No edema.       Neurological: She is alert.    Skin: Skin is warm and dry.    Psychiatric: She  has a normal mood and affect. Mood normal.            [facility]  Outside Information     US Non-OB Transvaginal    Anatomical Region Laterality Modality   Pelvis -- Ultrasound     Narrative    REASON FOR EXAM: pelvic pain, hx of fibroid     TECHNICAL FACTORS: B-mode and Doppler sonographic images were obtained of the pelvis in the transabdominal and transvaginal approach(es).     COMPARISON: None     FINDINGS: Uterus measures 6.6 x 4.4 x 2.8 cm. No fibroids identified. Uterine anomaly is suspected, possibly bicornuate uterus. Endometrial thickness measures 0.9 cm. Normal ovaries without torsion. Trace fluid within the cul-de-sac.     IMPRESSION:   No acute findings.   Uterine anomaly is suspected, possibly bicornuate uterus. Recommend elective outpatient pelvic MRI.     Electronically signed by Yousuf Berg MD on 8/27/2022 2:02 PM     5/11/2022 Routine     Narrative & Impression  EXAMINATION:  US PELVIS COMPLETE NON OB     CLINICAL HISTORY:  Patient with irregular periods; multiple skipped periods.; Irregular menstruation, unspecified     TECHNIQUE:  Transabdominal sonography of the pelvis was performed,     COMPARISON:  None.     FINDINGS:  Uterus:     Size: 4.9 x 4 cm     Masses: None     Endometrium: Normal in this pre menopausal patient, measuring 5 mm.     Right ovary:     Size: 2.8 x 2.7 cm     Appearance: Normal     Vascular flow: Normal.     Left ovary:     Size: 2.8 x 3.1 cm     Appearance: Normal     Vascular Flow: Normal.     Free Fluid:     None.     Impression:     No sonographic abnormality.      Electronically signed by: Pedro Hendrix MD  Date:                                            05/11/2022  Time:                                           15:13    12/21/2022 Routine     Narrative & Impression  EXAMINATION:  MRI PELVIS W WO CONTRAST     CLINICAL HISTORY:  Abnormal uterine bleeding (AUB), US inconclusive;Previous ultrasound outside of [facility] system with possible  bicornuate uterus.  Evaluate for mullerian abnormality;  Bicornate uterus     TECHNIQUE:  Multiplanar, multisequence MR imaging of the pelvis was performed before and after intravenous administration of Gadavist 5 cc.  The examination was acquired according to female pelvis protocol.     COMPARISON:  None     FINDINGS:  The uterus measures 5.6 by 3.3 by 4.5 cm.  The endometrial stripe measures 12 mm.  A distinct junctional zone is present.  No uterine mass is demonstrated.  The uterus exhibits normal morphology, with no anomaly demonstrated.     Both ovaries are normal in size, demonstrating an abundance of small follicles.  There is no abnormal adnexal mass.     The urinary bladder is within normal limits.  There is no pelvic lymphadenopathy.  A physiologic amount of pelvic free fluid is present.     Impression:     Normal MRI examination of the female pelvis.  No evidence for uterine anomaly or other significant pathology.        Electronically signed by: Guillermo Flynn MD  Date:                                            12/21/2022  Time:                                           13:02    Assessment:        1. Menstrual irregularity      Plan:      Menstrual irregularity      Follow up for as needed / for any GYN related issues.  For annual exam after age 21.     The above was reviewed discussed with the patient.    We discussed the reassuring findings on MRI of a normal-appearing uterus and pelvic anatomy.    The patient is issues with abnormal uterine bleeding and dysmenorrhea were discussed but at this time she is comfortable with conservative management.    The patient's questions regarding the above were answered and she is in agreement with the current plan.

## 2023-02-15 ENCOUNTER — TELEPHONE (OUTPATIENT)
Dept: ENDOCRINOLOGY | Facility: CLINIC | Age: 23
End: 2023-02-15
Payer: COMMERCIAL

## 2023-02-15 NOTE — TELEPHONE ENCOUNTER
To Grzegorz, advised we did not get the fax and ask that it be re sent. Gave fax number of [phone]. Was advised that they are trying to reach patient to sign up for [employer]. No due for new authorization.

## 2023-02-15 NOTE — TELEPHONE ENCOUNTER
----- Message from Salazar Berry sent at 2/15/2023  3:18 PM CST -----  Contact: [facility]  Type: Needs Medical Advice  Who Called:  [facility]    Best Call Back Number: [phone]  Additional Information: [facility]  would like to have you guys received the fax they sent over for clinical notes fax number [phone] they state it was sent over on 02/10. Please advise.

## 2023-02-22 ENCOUNTER — TELEPHONE (OUTPATIENT)
Dept: ENDOCRINOLOGY | Facility: CLINIC | Age: 23
End: 2023-02-22
Payer: COMMERCIAL

## 2023-02-22 NOTE — TELEPHONE ENCOUNTER
----- Message from Lilli Benitez RN sent at 2/20/2023  5:50 PM CST -----    ----- Message -----  From: Claire Sotelo  Sent: 2/20/2023   2:19 PM CST  To: [facility] Staff    Type: Needs Medical Advice  Who Called:  [facility]     Best Call Back Number: [phone]   Additional Information: wants to know did yall get prior auth form : sts they send the form out ahead of time to give everyone some time to respond and gather the info needed   Fax : [phone]

## 2023-02-22 NOTE — TELEPHONE ENCOUNTER
Spoke with Patricia Solomon & asked him to resend the PA forms for asfotase tray (STRENSIQ) 80 mg/0.8 mL Soln to us so we can initiate the PA  process & he verbalized an understanding.

## 2023-02-24 DIAGNOSIS — M85.80 OSTEOPENIA, UNSPECIFIED LOCATION: ICD-10-CM

## 2023-02-24 DIAGNOSIS — E83.39 ADULT HYPOPHOSPHATASIA: Primary | ICD-10-CM

## 2023-02-24 DIAGNOSIS — R82.994 HYPERCALCIURIA: ICD-10-CM

## 2023-03-02 ENCOUNTER — TELEPHONE (OUTPATIENT)
Dept: ENDOCRINOLOGY | Facility: CLINIC | Age: 23
End: 2023-03-02
Payer: COMMERCIAL

## 2023-03-02 NOTE — TELEPHONE ENCOUNTER
Ms. Ocasio reports she has not started her asfotase tray (STRENSIQ) 80 mg/0.8 mL Soln injections & is waiting on a response from [employer] if they will provide financial assistance for her co-pay. This matter will be settled this week per .    Left a VM for JANENE Finley at [facility] & informed her of the above & that we did receive the required documents list for the renewal authorization.

## 2023-03-02 NOTE — TELEPHONE ENCOUNTER
Left a VM asking Ms. Ocasio if she has started taking asfotase tray (STRENSIQ) 80 mg/0.8 mL Soln injections.

## 2023-03-15 ENCOUNTER — TELEPHONE (OUTPATIENT)
Dept: ENDOCRINOLOGY | Facility: CLINIC | Age: 23
End: 2023-03-15

## 2023-03-15 NOTE — TELEPHONE ENCOUNTER
----- Message from Suze Street sent at 3/15/2023 10:40 AM CDT -----  Contact: Patient  Type: Needs Medical Advice    Who Called:  Pharmacy for Patient regarding drug Strensiq  Pharmacy has reached out to pt at least 3 times. They need to apply for a pt assistance called [employer] (like financial assistance)to cover the copy amount. (They reached out on 2/27, 3/8, 3/13).  [facility] - [place] [address]  Phone: [phone] Fax: [phone]  Additional Information:  Please call the patient back at the phone number listed above to advise. Thank you!

## 2023-04-18 ENCOUNTER — TELEPHONE (OUTPATIENT)
Dept: ENDOCRINOLOGY | Facility: CLINIC | Age: 23
End: 2023-04-18
Payer: COMMERCIAL

## 2023-04-18 NOTE — TELEPHONE ENCOUNTER
----- Message from Carline Alejandre MA sent at 4/17/2023  4:34 PM CDT -----  Contact: Pt    ----- Message -----  From: Romel Paris, Patient Care Assistant  Sent: 4/17/2023   4:12 PM CDT  To: [facility] Celaya Staff    Type: Needs Medical Advice    Who Called: [facility]  Best Call Back Number: [phone]   Fax: [phone]  Inquiry/Question: Pharm is calling to get the status of a PA for the Pt's  Strensiq. Pharm is needing most recent clinical notes and supporting details for the medication Please advise. Thank you~

## 2023-04-18 NOTE — TELEPHONE ENCOUNTER
Attempted to contact Ms. Ocasio X 3 to inform that an US, labs, & bone density scans have been scheduled for her in order to continue PA for STRENSIQ & that she should check her patient portal but there was no answer. A VM was left to call Lilli @ [phone] for any questions & to inform us of when her appt is scheduled with an eye Provider.

## 2023-05-18 ENCOUNTER — HOSPITAL ENCOUNTER (OUTPATIENT)
Dept: RADIOLOGY | Facility: CLINIC | Age: 23
Discharge: HOME | End: 2023-05-18
Attending: INTERNAL MEDICINE
Payer: COMMERCIAL

## 2023-05-18 DIAGNOSIS — E83.39 ADULT HYPOPHOSPHATASIA: ICD-10-CM

## 2023-05-18 DIAGNOSIS — M85.80 OSTEOPENIA, UNSPECIFIED LOCATION: ICD-10-CM

## 2023-05-18 PROCEDURE — 77080 DXA BONE DENSITY AXIAL: CPT | Mod: 26,,, | Performed by: RADIOLOGY

## 2023-05-18 PROCEDURE — 77080 DXA BONE DENSITY AXIAL: CPT | Mod: TC,PO

## 2023-05-18 PROCEDURE — 77081 DXA BONE DENSITY APPENDICULR: CPT | Mod: 26,59,, | Performed by: RADIOLOGY

## 2023-05-18 PROCEDURE — 77081 DXA BONE DENSITY APPENDICULR: CPT | Mod: TC,PO

## 2023-05-18 PROCEDURE — 77080 DXA BONE DENSITY AXIAL SKELETON 1 OR MORE SITES: ICD-10-PCS | Mod: 26,,, | Performed by: RADIOLOGY

## 2023-05-18 PROCEDURE — 77081 DEXA BONE DENSITY APPENDICULAR SKELETON: ICD-10-PCS | Mod: 26,59,, | Performed by: RADIOLOGY

## 2023-05-19 ENCOUNTER — TELEPHONE (OUTPATIENT)
Dept: ENDOCRINOLOGY | Facility: CLINIC | Age: 23
End: 2023-05-19
Payer: COMMERCIAL

## 2023-05-19 NOTE — TELEPHONE ENCOUNTER
Bone Density Scans X 2 faxed to [facility] ([place], ) with a receipt confirmation.    Spoke with Ms. Ocasio & scheduled a US Renal on 6/1/23 & she reports she will get an eye exam on that day as well. Her ALP labs have not resulted yet.

## 2023-06-01 ENCOUNTER — HOSPITAL ENCOUNTER (OUTPATIENT)
Dept: RADIOLOGY | Facility: HOSPITAL | Age: 23
Discharge: HOME | End: 2023-06-01
Attending: INTERNAL MEDICINE
Payer: COMMERCIAL

## 2023-06-01 DIAGNOSIS — E83.39 ADULT HYPOPHOSPHATASIA: ICD-10-CM

## 2023-06-01 DIAGNOSIS — R82.994 HYPERCALCIURIA: ICD-10-CM

## 2023-06-01 PROCEDURE — 76770 US EXAM ABDO BACK WALL COMP: CPT | Mod: 26,,, | Performed by: RADIOLOGY

## 2023-06-01 PROCEDURE — 76770 US EXAM ABDO BACK WALL COMP: CPT | Mod: TC

## 2023-06-01 PROCEDURE — 76770 US RETROPERITONEAL COMPLETE: ICD-10-PCS | Mod: 26,,, | Performed by: RADIOLOGY

## 2023-06-09 ENCOUNTER — TELEPHONE (OUTPATIENT)
Dept: ENDOCRINOLOGY | Facility: CLINIC | Age: 23
End: 2023-06-09
Payer: COMMERCIAL

## 2023-06-09 NOTE — TELEPHONE ENCOUNTER
US Retroperitoneal Complete (6/1/23), Eye Exam (6/1/23), & CMP, ALP Labs X 2 (5/18/23) faxed to [facility] with a receipt confirmation.

## 2023-06-16 ENCOUNTER — TELEPHONE (OUTPATIENT)
Dept: ENDOCRINOLOGY | Facility: CLINIC | Age: 23
End: 2023-06-16
Payer: COMMERCIAL

## 2023-06-16 DIAGNOSIS — R74.8 LOW SERUM ALKALINE PHOSPHATASE: ICD-10-CM

## 2023-06-16 DIAGNOSIS — E83.39 ADULT HYPOPHOSPHATASIA: Primary | ICD-10-CM

## 2023-06-16 NOTE — TELEPHONE ENCOUNTER
Received a call from Steph KIMBLE RN at [facility] who reports that a Vitamin B6 level is required for the PA for asfotase tray (STRENSIQ) 80 mg/0.8 mL Soln & the 6/30/23 progress note if Ms. Ocasio come to her visit. Please fax the 6/30/23 progress note & Vitamin B6 level (once resulted) to Steph KIMBLE RN ([facility] @ [phone].    Vitamin B6 level is scheduled on 6/22/23.

## 2023-06-22 ENCOUNTER — LAB VISIT (OUTPATIENT)
Dept: LAB | Facility: HOSPITAL | Age: 23
End: 2023-06-22
Attending: INTERNAL MEDICINE
Payer: COMMERCIAL

## 2023-06-22 DIAGNOSIS — E83.39 ADULT HYPOPHOSPHATASIA: ICD-10-CM

## 2023-06-22 DIAGNOSIS — R74.8 LOW SERUM ALKALINE PHOSPHATASE: ICD-10-CM

## 2023-06-22 PROCEDURE — 84207 ASSAY OF VITAMIN B-6: CPT | Performed by: INTERNAL MEDICINE

## 2023-06-22 PROCEDURE — 36415 COLL VENOUS BLD VENIPUNCTURE: CPT | Mod: PO | Performed by: INTERNAL MEDICINE

## 2023-06-27 LAB — PYRIDOXAL SERPL-MCNC: 62 UG/L (ref 5–50)

## 2023-06-30 ENCOUNTER — OFFICE VISIT (OUTPATIENT)
Dept: ENDOCRINOLOGY | Facility: CLINIC | Age: 23
End: 2023-06-30
Payer: COMMERCIAL

## 2023-06-30 ENCOUNTER — LAB VISIT (OUTPATIENT)
Dept: LAB | Facility: HOSPITAL | Age: 23
End: 2023-06-30
Attending: INTERNAL MEDICINE
Payer: COMMERCIAL

## 2023-06-30 VITALS
SYSTOLIC BLOOD PRESSURE: 100 MMHG | OXYGEN SATURATION: 99 % | TEMPERATURE: 98 F | BODY MASS INDEX: 22.19 KG/M2 | DIASTOLIC BLOOD PRESSURE: 70 MMHG | HEIGHT: 61 IN | HEART RATE: 59 BPM | WEIGHT: 117.5 LBS

## 2023-06-30 DIAGNOSIS — R74.8 LOW SERUM ALKALINE PHOSPHATASE: ICD-10-CM

## 2023-06-30 DIAGNOSIS — M85.80 OSTEOPENIA, UNSPECIFIED LOCATION: ICD-10-CM

## 2023-06-30 DIAGNOSIS — F90.9 ATTENTION DEFICIT HYPERACTIVITY DISORDER (ADHD), UNSPECIFIED ADHD TYPE: ICD-10-CM

## 2023-06-30 DIAGNOSIS — R73.09 DYSGLYCEMIA: ICD-10-CM

## 2023-06-30 DIAGNOSIS — E78.5 DYSLIPIDEMIA: ICD-10-CM

## 2023-06-30 DIAGNOSIS — E83.39 ADULT HYPOPHOSPHATASIA: ICD-10-CM

## 2023-06-30 DIAGNOSIS — E83.39: ICD-10-CM

## 2023-06-30 DIAGNOSIS — R82.994 HYPERCALCIURIA: ICD-10-CM

## 2023-06-30 DIAGNOSIS — E55.9 HYPOVITAMINOSIS D: ICD-10-CM

## 2023-06-30 DIAGNOSIS — R53.82 CHRONIC FATIGUE: ICD-10-CM

## 2023-06-30 DIAGNOSIS — F32.A DEPRESSION, UNSPECIFIED DEPRESSION TYPE: ICD-10-CM

## 2023-06-30 DIAGNOSIS — E83.39 ADULT HYPOPHOSPHATASIA: Primary | ICD-10-CM

## 2023-06-30 PROCEDURE — 82542 COL CHROMOTOGRAPHY QUAL/QUAN: CPT | Performed by: INTERNAL MEDICINE

## 2023-06-30 PROCEDURE — 99999 PR PBB SHADOW E&M-EST. PATIENT-LVL V: CPT | Mod: PBBFAC,,, | Performed by: INTERNAL MEDICINE

## 2023-06-30 PROCEDURE — 82139 AMINO ACIDS QUAN 6 OR MORE: CPT | Performed by: INTERNAL MEDICINE

## 2023-06-30 PROCEDURE — 3078F PR MOST RECENT DIASTOLIC BLOOD PRESSURE < 80 MM HG: ICD-10-PCS | Mod: CPTII,S$GLB,, | Performed by: INTERNAL MEDICINE

## 2023-06-30 PROCEDURE — 83735 ASSAY OF MAGNESIUM: CPT | Performed by: INTERNAL MEDICINE

## 2023-06-30 PROCEDURE — 3008F BODY MASS INDEX DOCD: CPT | Mod: CPTII,S$GLB,, | Performed by: INTERNAL MEDICINE

## 2023-06-30 PROCEDURE — 82306 VITAMIN D 25 HYDROXY: CPT | Performed by: INTERNAL MEDICINE

## 2023-06-30 PROCEDURE — 3008F PR BODY MASS INDEX (BMI) DOCUMENTED: ICD-10-PCS | Mod: CPTII,S$GLB,, | Performed by: INTERNAL MEDICINE

## 2023-06-30 PROCEDURE — 1159F PR MEDICATION LIST DOCUMENTED IN MEDICAL RECORD: ICD-10-PCS | Mod: CPTII,S$GLB,, | Performed by: INTERNAL MEDICINE

## 2023-06-30 PROCEDURE — 99999 PR PBB SHADOW E&M-EST. PATIENT-LVL V: ICD-10-PCS | Mod: PBBFAC,,, | Performed by: INTERNAL MEDICINE

## 2023-06-30 PROCEDURE — 1159F MED LIST DOCD IN RCRD: CPT | Mod: CPTII,S$GLB,, | Performed by: INTERNAL MEDICINE

## 2023-06-30 PROCEDURE — 3074F PR MOST RECENT SYSTOLIC BLOOD PRESSURE < 130 MM HG: ICD-10-PCS | Mod: CPTII,S$GLB,, | Performed by: INTERNAL MEDICINE

## 2023-06-30 PROCEDURE — 84550 ASSAY OF BLOOD/URIC ACID: CPT | Performed by: INTERNAL MEDICINE

## 2023-06-30 PROCEDURE — 1160F PR REVIEW ALL MEDS BY PRESCRIBER/CLIN PHARMACIST DOCUMENTED: ICD-10-PCS | Mod: CPTII,S$GLB,, | Performed by: INTERNAL MEDICINE

## 2023-06-30 PROCEDURE — 3078F DIAST BP <80 MM HG: CPT | Mod: CPTII,S$GLB,, | Performed by: INTERNAL MEDICINE

## 2023-06-30 PROCEDURE — 82652 VIT D 1 25-DIHYDROXY: CPT | Performed by: INTERNAL MEDICINE

## 2023-06-30 PROCEDURE — 99214 PR OFFICE/OUTPT VISIT, EST, LEVL IV, 30-39 MIN: ICD-10-PCS | Mod: S$GLB,,, | Performed by: INTERNAL MEDICINE

## 2023-06-30 PROCEDURE — 1160F RVW MEDS BY RX/DR IN RCRD: CPT | Mod: CPTII,S$GLB,, | Performed by: INTERNAL MEDICINE

## 2023-06-30 PROCEDURE — 99214 OFFICE O/P EST MOD 30 MIN: CPT | Mod: S$GLB,,, | Performed by: INTERNAL MEDICINE

## 2023-06-30 PROCEDURE — 80061 LIPID PANEL: CPT | Performed by: INTERNAL MEDICINE

## 2023-06-30 PROCEDURE — 84207 ASSAY OF VITAMIN B-6: CPT | Performed by: INTERNAL MEDICINE

## 2023-06-30 PROCEDURE — 3074F SYST BP LT 130 MM HG: CPT | Mod: CPTII,S$GLB,, | Performed by: INTERNAL MEDICINE

## 2023-06-30 PROCEDURE — 83036 HEMOGLOBIN GLYCOSYLATED A1C: CPT | Performed by: INTERNAL MEDICINE

## 2023-06-30 PROCEDURE — 85025 COMPLETE CBC W/AUTO DIFF WBC: CPT | Performed by: INTERNAL MEDICINE

## 2023-06-30 PROCEDURE — 84100 ASSAY OF PHOSPHORUS: CPT | Performed by: INTERNAL MEDICINE

## 2023-06-30 RX ORDER — PEN NEEDLE, DIABETIC 30 GX3/16"
1 NEEDLE, DISPOSABLE MISCELLANEOUS WEEKLY
Qty: 400 EACH | Refills: 3 | Status: SHIPPED | OUTPATIENT
Start: 2023-06-30 | End: 2023-07-18

## 2023-06-30 RX ORDER — SYRINGE WITH NEEDLE, 1 ML 27GX1/2"
1 SYRINGE, EMPTY DISPOSABLE MISCELLANEOUS WEEKLY
Qty: 400 EACH | Refills: 3 | Status: SHIPPED | OUTPATIENT
Start: 2023-06-30 | End: 2023-07-18

## 2023-06-30 NOTE — PROGRESS NOTES
Subjective:      Patient ID: Munira Ocasio is a 22 y.o. female.    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   History of Present Illness    The patient, Munira Ocasio is a 22 yr old lady seen in [facility] today on account of possible adult hypophatasia.  Her mother ((Rafaela Ocasio - MRN; [id number])) is a clinic patient as well.      Her background comorbidities are as detailed below;  Patient Active Problem List   Diagnosis    Attention deficit disorder    Hypophosphatasia    Low serum alkaline phosphatase    Hypovitaminosis D    Sleep deprivation    Hypersomnolent    Menstrual irregularity    Hypercalciuria    Osteopenia    Renal phosphaturia    Adult hypophosphatasia    Depression    Chronic fatigue    Somnolence, daytime     Patients baseline Iroquois is 20. Patient has never had a sleep study.   Patient complains of chronic fatigue over last 6 yrs.  Patient is hypersomnolent. She sleeps ~ 8-12hrs daily. She goes to sleep early.  Patient wakes up feeling tired. She is a deep sleeper and not restless during sleep.  She has a lot of day time somnolence. She has a lot of early morning headaches.  She does not dream frequently.    Periods; irregular. 5-8/ she often skips periods She had periods once every 6months last year  Menarche; 17yrs. She has always had irregular periods.  She has had 1-2 periods thus far this year.   Grav 0.  Patient does not smoke, Patient drinks ETOH very irregularly.  Patient just graduated from high school.    Has menorrhagia++ and dysmenorrhea.. She has some hirsutism.  She had significant facial, back and chest acneiform eruptions.    Patient has never had any fractures. She does not have frequent dental problems. She did loss her early front teeth early when young.    She has generalized myalgias and arthralgias which are  "long standing.  She is having generalized body and bone aches and associated fatigue.  Patient Has recently had an interruption of her strensiq injections.         Review of Systems   Constitutional:  Positive for fatigue (chronic). Negative for activity change, appetite change, chills and diaphoresis.   HENT:  Negative for facial swelling, hearing loss, sinus pressure, sore throat, trouble swallowing and voice change.    Eyes:  Negative for photophobia and visual disturbance.   Respiratory:  Negative for apnea, cough, chest tightness, shortness of breath, wheezing and stridor.    Cardiovascular:  Negative for chest pain, palpitations and leg swelling.   Gastrointestinal:  Negative for abdominal distention, abdominal pain, constipation, diarrhea, nausea and vomiting.   Endocrine: Negative for cold intolerance, heat intolerance, polydipsia, polyphagia and polyuria.   Genitourinary:  Positive for menstrual problem (long standing menstrual irregularity as detailed above.). Negative for difficulty urinating, dysuria, flank pain, frequency, hematuria and urgency.   Musculoskeletal:  Positive for arthralgias (multiple joints; mainly of weight bearing joints.) and myalgias (chronic). Negative for back pain, gait problem, joint swelling, neck pain and neck stiffness.   Skin:  Negative for color change, pallor and rash.   Neurological:  Positive for weakness (intermittent). Negative for dizziness, tremors, seizures, syncope, light-headedness, numbness and headaches.   Hematological:  Does not bruise/bleed easily.   Psychiatric/Behavioral:  Positive for sleep disturbance (As detailed above.). Negative for agitation, confusion, dysphoric mood and hallucinations. The patient is not nervous/anxious.      Objective:   /70 (BP Location: Left arm, Patient Position: Sitting, BP Method: Medium (Manual))   Pulse (!) 59   Temp 98.4 °F (36.9 °C) (Oral)   Ht 5' 1" (1.549 m)   Wt 53.3 kg (117 lb 8.1 oz)   SpO2 99%   BMI 22.20 " kg/m² Body surface area is 1.51 meters squared.             Lab Review:        Latest Reference Range & Units 05/06/22 08:13 05/11/22 09:23 05/11/22 09:41 12/06/22 15:28 12/06/22 15:39 05/18/23 13:19 06/22/23 14:10   WBC 3.90 - 12.70 K/uL   7.08       RBC 4.00 - 5.40 M/uL   4.90       Hemoglobin 12.0 - 16.0 g/dL   14.0       Hematocrit 37.0 - 48.5 %   42.9       MCV 82 - 98 fL   88       MCH 27.0 - 31.0 pg   28.6       MCHC 32.0 - 36.0 g/dL   32.6       RDW 11.5 - 14.5 %   12.3       Platelets 150 - 450 K/uL   294       MPV 9.2 - 12.9 fL   9.5       Gran % 38.0 - 73.0 %   47.4       Lymph % 18.0 - 48.0 %   42.5       Mono % 4.0 - 15.0 %   6.2       Eosinophil % 0.0 - 8.0 %   1.1       Basophil % 0.0 - 1.9 %   0.7       Immature Granulocytes 0.0 - 0.5 %   2.1 (H)       Gran # (ANC) 1.8 - 7.7 K/uL   3.4       Lymph # 1.0 - 4.8 K/uL   3.0       Mono # 0.3 - 1.0 K/uL   0.4       Eos # 0.0 - 0.5 K/uL   0.1       Baso # 0.00 - 0.20 K/uL   0.05       Immature Grans (Abs) 0.00 - 0.04 K/uL   0.15 (H)       nRBC 0 /100 WBC   0       Differential Method    Automated       Sodium 136 - 145 mmol/L   139  141 139    Potassium 3.5 - 5.1 mmol/L   4.0  3.6 4.0    Chloride 95 - 110 mmol/L   106  105 106    CO2 23 - 29 mmol/L   24  25 26    Anion Gap 8 - 16 mmol/L   9  11 7 (L)    BUN 6 - 20 mg/dL   11  9 14    Creatinine 0.5 - 1.4 mg/dL   0.7  0.8 0.7    eGFR >60 mL/min/1.73 m^2     >60.0 >60.0    eGFR if non African American >60 mL/min/1.73 m^2   >60.0       eGFR if African American >60 mL/min/1.73 m^2   >60.0       Glucose 70 - 110 mg/dL   83  75 78    Calcium 8.7 - 10.5 mg/dL   9.6  10.2 9.2    Ionized Calcium 1.06 - 1.42 mmol/L   1.25  1.25     Phosphorus 2.7 - 4.5 mg/dL   4.9 (H)  3.8     Magnesium 1.6 - 2.6 mg/dL   2.1  2.1     Alkaline Phosphatase 55 - 135 U/L   26 (L)  27 (L) 1,630 (H)    Alkaline Phosphatase, Total 31 - 125 U/L   25 (L)   1534 (H)    Alkaline Phos Bone Specific 4.7 - 17.8 ug/L   5.5   >360.0 (H)    PROTEIN  TOTAL 6.0 - 8.4 g/dL   6.9  7.6 6.6    Albumin 3.5 - 5.2 g/dL   4.1  5.1 4.2    Albumin 3.6 - 5.1 g/dL   4.3       Uric Acid 2.4 - 5.7 mg/dL   4.6       BILIRUBIN TOTAL 0.1 - 1.0 mg/dL   0.4  0.6 0.5    AST 10 - 40 U/L   20  15 17    ALT 10 - 44 U/L   15  9 (L) 10    Cholesterol 120 - 199 mg/dL   148       HDL 40 - 75 mg/dL   58       HDL/Cholesterol Ratio 20.0 - 50.0 %   39.2       LDL Cholesterol External 63.0 - 159.0 mg/dL   82.2       Non-HDL Cholesterol mg/dL   90       Total Cholesterol/HDL Ratio 2.0 - 5.0    2.6       Triglycerides 30 - 150 mg/dL   39       Intestine, Alk Phos 1 - 24 %   Test Not Performed   8    Liver, Alk Phos 25 - 69 %   Test Not Performed   4 (L)    PLACENTAL ISOENZYME 0 %   Test Not Performed   0    Macrohepatic Isoenzyme 0 %   Test Not Performed   Test Not Performed    Alkaline Phosphatase Isoenzyme Interpretation    Test Not Performed   Test Not Performed    Vit D, 25-Hydroxy 30 - 96 ng/mL   26 (L)  28 (L)     Vitamin B6 5 - 50 ug/L   83 (H)    62 (H)   Hemoglobin A1C External 4.0 - 5.6 %   4.8  4.7     Estimated Avg Glucose 68 - 131 mg/dL   91  88     TSH 0.400 - 4.000 uIU/mL   0.842       PTH 9.0 - 77.0 pg/mL   57.7  53.4     Dihydrotestosterone pg/mL   80       Estradiol See Text pg/mL   28       Estrogen pg/mL   61       FSH See Text mIU/mL   6.04       LH See Text mIU/mL   14.2       Progesterone See Text ng/mL   0.4       Prolactin 5.2 - 26.5 ng/mL   12.4       Sex Hormone Binding Globulin 17 - 124 nmol/L   36       Testosterone 2 - 45 ng/dL   18       Testosterone, Bioavailable 0.5 - 8.5 ng/dL   3.9       Testosterone, Free 0.2 - 5.0 pg/mL   2.0       Specimen UA     Urine, Clean Catch  Urine, Clean Catch      Color, UA Yellow, Straw, Clarisa   Yellow, Straw, Clarisa     Yellow  Yellow      Appearance, UA Clear   Clear     Clear  Clear      Specific Gravity, UA 1.005 - 1.030   1.005 - 1.030     1.010  1.010      pH, UA 5.0 - 8.0   5.0 - 8.0     7.0  7.0      Protein, UA Negative    Negative     Negative  Negative      Glucose, UA Negative   Negative     Negative  Negative      Ketones, UA Negative   Negative     Negative  Negative      Occult Blood UA Negative   Negative     Negative  Negative      NITRITE UA Negative   Negative     Negative  Negative      Bilirubin (UA) Negative   Negative     Negative  Negative      Leukocytes, UA Negative   Negative     Negative  Negative      Calcium, Urine 0.0 - 15.0 mg/dL  0.0 - 15.0 mg/dL    12.1  12.1      Creatinine, Urine 15.0 - 325.0 mg/dL  15.0 - 325.0 mg/dL  15.0 - 325.0 mg/dL    77.0  77.0  77.0      Urine Microalbumin ug/mL    <5.0      Phosphorus, Ur Not established mg/dL  Not established mg/dL    19.0  19.0      MICROALB/CREAT RATIO 0.0 - 30.0 ug/mg    Unable to calculate      Genetic counseling?    No       Genso Specimen Type    Blood       Miscellaneous Genetic Test Name    See BELOW       Parental or Sibling Testing?    No       Miscellaneous Test Name   QUEST [id number] amino acid quant        Test Result   See result image under hyperlink        Specimen Type   Urine        Reference Lab   SEE COMMENT SEE COMMENT       Test Result    See result image under hyperlink       Bone, Alk Phos 28 - 66 %   Test Not Performed   88 (H)    XR CHEST PA AND LATERAL  Rpt         (H): Data is abnormally high  (L): Data is abnormally low  Rpt: View report in Results Review for more information    Assessment:     1. Adult hypophosphatasia  Misc Sendout Test, Non-Blood Urine Amino acid quantitative assay    Vitamin B6 Profile      2. Hypercalciuria  Misc Sendout Test, Non-Blood Urine Amino acid quantitative assay    Vitamin B6 Profile    Urinalysis    Microalbumin/Creatinine Ratio, Urine    Phosphorus, Random Urine    Calcium, Random Urine    Creatinine, Random Urine      3. Renal phosphaturia  Urinalysis    Microalbumin/Creatinine Ratio, Urine    Phosphorus, Random Urine    Calcium, Random Urine    Creatinine, Random Urine      4. Hypovitaminosis D  Vitamin  D    Calcitriol    CBC Auto Differential    Uric Acid      5. Osteopenia, unspecified location  Magnesium    Phosphorus    Vitamin D    Calcitriol    CBC Auto Differential      6. Low serum alkaline phosphatase        7. Chronic fatigue        8. Attention deficit hyperactivity disorder (ADHD), unspecified ADHD type        9. Depression, unspecified depression type        10. Dyslipidemia  Lipid Panel      11. Dysglycemia  Hemoglobin A1C             Regarding adult hypophosphatasia; to continue strensiq repletion therapy as before.  Regarding ADHD; presently of amphetamine therapy for now.  Regarding menstrual irregularity with mild hirsutism;  To retrieve recent pelvic USS results and obtain relevant labs as detailed above.  Regarding chronic sleep deprivation; to obtain formal sleep study to evaluate patient on account of hypersomnolence, non restorative sleep and chronic fatigue. She describes some episodes suggestive of possible cataplexy and possible night terrors. To urgently obtain sleep study to ensure she does not have untreated parasomia like narcolepsy.  Regarding possible hypovitaminosis D; to check 25 OH vit D levels and replete as needed.  Regarding menstrual irregularities; to recheck basic ovarian functional testing and obtain pelvic ultrasound.  Her recent pelvic USS shows a bocornuate uterus. To refer for formal OBGYN consultation.       Plan:     To arrange for ongoing ffup with [facility] (Dr Keith or Ryan) and Medical genetics with Dr Elmore.

## 2023-07-01 LAB
25(OH)D3+25(OH)D2 SERPL-MCNC: 37 NG/ML (ref 30–96)
BASOPHILS # BLD AUTO: 0.02 K/UL (ref 0–0.2)
BASOPHILS NFR BLD: 0.4 % (ref 0–1.9)
CHOLEST SERPL-MCNC: 110 MG/DL (ref 120–199)
CHOLEST/HDLC SERPL: 1.9 {RATIO} (ref 2–5)
DIFFERENTIAL METHOD: NORMAL
EOSINOPHIL # BLD AUTO: 0.1 K/UL (ref 0–0.5)
EOSINOPHIL NFR BLD: 1.1 % (ref 0–8)
ERYTHROCYTE [DISTWIDTH] IN BLOOD BY AUTOMATED COUNT: 12.9 % (ref 11.5–14.5)
ESTIMATED AVG GLUCOSE: 88 MG/DL (ref 68–131)
HBA1C MFR BLD: 4.7 % (ref 4–5.6)
HCT VFR BLD AUTO: 39.3 % (ref 37–48.5)
HDLC SERPL-MCNC: 59 MG/DL (ref 40–75)
HDLC SERPL: 53.6 % (ref 20–50)
HGB BLD-MCNC: 12.8 G/DL (ref 12–16)
IMM GRANULOCYTES # BLD AUTO: 0.01 K/UL (ref 0–0.04)
IMM GRANULOCYTES NFR BLD AUTO: 0.2 % (ref 0–0.5)
LDLC SERPL CALC-MCNC: 42 MG/DL (ref 63–159)
LYMPHOCYTES # BLD AUTO: 2.5 K/UL (ref 1–4.8)
LYMPHOCYTES NFR BLD: 46.3 % (ref 18–48)
MAGNESIUM SERPL-MCNC: 2 MG/DL (ref 1.6–2.6)
MCH RBC QN AUTO: 28.6 PG (ref 27–31)
MCHC RBC AUTO-ENTMCNC: 32.6 G/DL (ref 32–36)
MCV RBC AUTO: 88 FL (ref 82–98)
MONOCYTES # BLD AUTO: 0.4 K/UL (ref 0.3–1)
MONOCYTES NFR BLD: 7.7 % (ref 4–15)
NEUTROPHILS # BLD AUTO: 2.4 K/UL (ref 1.8–7.7)
NEUTROPHILS NFR BLD: 44.3 % (ref 38–73)
NONHDLC SERPL-MCNC: 51 MG/DL
NRBC BLD-RTO: 0 /100 WBC
PHOSPHATE SERPL-MCNC: 3.5 MG/DL (ref 2.7–4.5)
PLATELET # BLD AUTO: 276 K/UL (ref 150–450)
PMV BLD AUTO: 9.7 FL (ref 9.2–12.9)
RBC # BLD AUTO: 4.48 M/UL (ref 4–5.4)
TRIGL SERPL-MCNC: 45 MG/DL (ref 30–150)
URATE SERPL-MCNC: 4.5 MG/DL (ref 2.4–5.7)
WBC # BLD AUTO: 5.46 K/UL (ref 3.9–12.7)

## 2023-07-07 LAB
1,25(OH)2D3 SERPL-MCNC: 62 PG/ML (ref 20–79)
1ME-HIST UR-SCNC: 553 NMOL/MG CR (ref 23–1339)
3ME-HISTIDINE UR-SCNC: 164 NMOL/MG CR (ref 70–246)
A-AMINOBUTYR UR-SCNC: 9 NMOL/MG CR
AAA UR-SCNC: 16 NMOL/MG CR
ALANINE UR-SCNC: 359 NMOL/MG CR (ref 56–518)
ALLOISOLEUCINE/CREAT UR-SRTO: 0 NMOL/MG CR
AMINO ACIDS UR: ABNORMAL
ANSERINE/CREAT UR-RTO: 10 NMOL/MG CR
ARGININE UR-SCNC: 17 NMOL/MG CR
ARGININOSUCCINATE/CREAT UR-RTO: 15 NMOL/MG CR
ASPARAGINE UR-SCNC: 92 NMOL/MG CR (ref 25–238)
ASPARTATE/CREAT UR-RTO: 3 NMOL/MG CR
B-AIB UR-SCNC: 174 NMOL/MG CR
B-ALANINE UR-SCNC: 56 NMOL/MG CR
CARNOSINE UR-SCNC: 13 NMOL/MG CR
CITRULLINE UR-SCNC: 3 NMOL/MG CR
CYSTATHIONIN UR-SCNC: 5 NMOL/MG CR
CYSTINE UR-SCNC: 29 NMOL/MG CR (ref 10–98)
ETHANOLAMINE/CREAT UR-RTO: 263 NMOL/MG CR (ref 95–471)
GABA/CREAT UR-RTO: 2 NMOL/MG CR
GLUTAMATE UR-SCNC: 26 NMOL/MG CR
GLUTAMINE UR-SCNC: 512 NMOL/MG CR (ref 93–686)
GLYCINE UR-SCNC: 2901 NMOL/MG CR (ref 229–2989)
HISTIDINE UR-SCNC: 351 NMOL/MG CR (ref 81–1128)
HOMOCITRULLINE/CREAT UR-RTO: 11 NMOL/MG CR
ISOLEUCINE UR-SCNC: 13 NMOL/MG CR
LEUCINE UR-SCNC: 22 NMOL/MG CR
LYSINE UR-SCNC: 46 NMOL/MG CR (ref 15–271)
METHIONINE UR-SCNC: 4 NMOL/MG CR
OH-LYSINE/CREAT UR-RTO: 4 NMOL/MG CR
OH-PROLINE/CREAT UR-RTO: 6 NMOL/MG CR
ORNITHINE UR-SCNC: 10 NMOL/MG CR
PETN/CREAT UR-RTO: 92 NMOL/MG CR
PHE UR-SCNC: 33 NMOL/MG CR (ref 13–70)
PHOSPHOSERINE/CREAT UR-RTO: 0 NMOL/MG CR
PROLINE UR-SCNC: 9 NMOL/MG CR
SARCOSINE/CREAT UR-RTO: 1 NMOL/MG CR
SERINE UR-SCNC: 377 NMOL/MG CR (ref 97–540)
TAURINE UR-SCNC: ABNORMAL NMOL/MG CR (ref 24–1531)
THREONINE UR-SCNC: 165 NMOL/MG CR (ref 31–278)
TRYPTOPHAN/CREAT UR-RTO: 27 NMOL/MG CR (ref 18–114)
TYROSINE UR-SCNC: 38 NMOL/MG CR (ref 15–115)
VALINE UR-SCNC: 31 NMOL/MG CR (ref 11–61)

## 2023-07-11 ENCOUNTER — TELEPHONE (OUTPATIENT)
Dept: ENDOCRINOLOGY | Facility: CLINIC | Age: 23
End: 2023-07-11
Payer: COMMERCIAL

## 2023-07-11 LAB
4PYRIDOXATE SERPL-MCNC: 38 MCG/L (ref 3–30)
PYRIDOXAL PHOS SERPL-MCNC: 57 MCG/L (ref 5–50)

## 2023-07-11 NOTE — TELEPHONE ENCOUNTER
Spoke to prior authorization department. They are re faxing a letter with information to get medication approved.

## 2023-07-11 NOTE — TELEPHONE ENCOUNTER
----- Message from Carline Alejandre MA sent at 7/10/2023  1:45 PM CDT -----    ----- Message -----  From: Zoya Graf MA  Sent: 7/10/2023   9:06 AM CDT  To: Modesto Celaya Staff    Type:  Pharmacy Calling to Clarify an RX    Name of Caller:  [facility]    Pharmacy Name:    [facility] - [place], [place] - [address] [facility] [address]  [address] [facility] [address]  [place] [address]  Phone: [phone] Fax: [phone]    [facility] - [address] [address]  Phone: [phone] Fax: [phone]      Prescription Name:  Strensiq 80 mg  What do they need to clarify?:   PA   Best Call Back Number:  [phone] ( Osvaldo ) or anyone at that number could help  Additional Information:  please advise

## 2023-07-18 ENCOUNTER — LAB VISIT (OUTPATIENT)
Dept: LAB | Facility: HOSPITAL | Age: 23
End: 2023-07-18
Attending: STUDENT IN AN ORGANIZED HEALTH CARE EDUCATION/TRAINING PROGRAM
Payer: COMMERCIAL

## 2023-07-18 ENCOUNTER — OFFICE VISIT (OUTPATIENT)
Dept: FAMILY MEDICINE | Facility: CLINIC | Age: 23
End: 2023-07-18
Payer: COMMERCIAL

## 2023-07-18 VITALS
HEIGHT: 61 IN | HEART RATE: 59 BPM | DIASTOLIC BLOOD PRESSURE: 62 MMHG | OXYGEN SATURATION: 99 % | TEMPERATURE: 99 F | WEIGHT: 119.5 LBS | BODY MASS INDEX: 22.56 KG/M2 | SYSTOLIC BLOOD PRESSURE: 94 MMHG

## 2023-07-18 DIAGNOSIS — F33.1 MODERATE EPISODE OF RECURRENT MAJOR DEPRESSIVE DISORDER: ICD-10-CM

## 2023-07-18 DIAGNOSIS — Z13.31 POSITIVE DEPRESSION SCREENING: ICD-10-CM

## 2023-07-18 DIAGNOSIS — R74.8 ELEVATED ALKALINE PHOSPHATASE LEVEL: ICD-10-CM

## 2023-07-18 DIAGNOSIS — R74.8 ELEVATED ALKALINE PHOSPHATASE LEVEL: Primary | ICD-10-CM

## 2023-07-18 LAB
ALBUMIN SERPL BCP-MCNC: 4.1 G/DL (ref 3.5–5.2)
ALP SERPL-CCNC: 1180 U/L (ref 55–135)
ALT SERPL W/O P-5'-P-CCNC: 9 U/L (ref 10–44)
AST SERPL-CCNC: 14 U/L (ref 10–40)
BILIRUB DIRECT SERPL-MCNC: 0.1 MG/DL (ref 0.1–0.3)
BILIRUB SERPL-MCNC: 0.4 MG/DL (ref 0.1–1)
GGT SERPL-CCNC: 10 U/L (ref 8–55)
PROT SERPL-MCNC: 6.5 G/DL (ref 6–8.4)

## 2023-07-18 PROCEDURE — 3078F PR MOST RECENT DIASTOLIC BLOOD PRESSURE < 80 MM HG: ICD-10-PCS | Mod: CPTII,S$GLB,, | Performed by: STUDENT IN AN ORGANIZED HEALTH CARE EDUCATION/TRAINING PROGRAM

## 2023-07-18 PROCEDURE — 3008F BODY MASS INDEX DOCD: CPT | Mod: CPTII,S$GLB,, | Performed by: STUDENT IN AN ORGANIZED HEALTH CARE EDUCATION/TRAINING PROGRAM

## 2023-07-18 PROCEDURE — 80076 HEPATIC FUNCTION PANEL: CPT | Performed by: STUDENT IN AN ORGANIZED HEALTH CARE EDUCATION/TRAINING PROGRAM

## 2023-07-18 PROCEDURE — 99215 OFFICE O/P EST HI 40 MIN: CPT | Mod: S$GLB,,, | Performed by: STUDENT IN AN ORGANIZED HEALTH CARE EDUCATION/TRAINING PROGRAM

## 2023-07-18 PROCEDURE — 3074F PR MOST RECENT SYSTOLIC BLOOD PRESSURE < 130 MM HG: ICD-10-PCS | Mod: CPTII,S$GLB,, | Performed by: STUDENT IN AN ORGANIZED HEALTH CARE EDUCATION/TRAINING PROGRAM

## 2023-07-18 PROCEDURE — 3078F DIAST BP <80 MM HG: CPT | Mod: CPTII,S$GLB,, | Performed by: STUDENT IN AN ORGANIZED HEALTH CARE EDUCATION/TRAINING PROGRAM

## 2023-07-18 PROCEDURE — 3074F SYST BP LT 130 MM HG: CPT | Mod: CPTII,S$GLB,, | Performed by: STUDENT IN AN ORGANIZED HEALTH CARE EDUCATION/TRAINING PROGRAM

## 2023-07-18 PROCEDURE — 3044F HG A1C LEVEL LT 7.0%: CPT | Mod: CPTII,S$GLB,, | Performed by: STUDENT IN AN ORGANIZED HEALTH CARE EDUCATION/TRAINING PROGRAM

## 2023-07-18 PROCEDURE — 82977 ASSAY OF GGT: CPT | Performed by: STUDENT IN AN ORGANIZED HEALTH CARE EDUCATION/TRAINING PROGRAM

## 2023-07-18 PROCEDURE — 1160F RVW MEDS BY RX/DR IN RCRD: CPT | Mod: CPTII,S$GLB,, | Performed by: STUDENT IN AN ORGANIZED HEALTH CARE EDUCATION/TRAINING PROGRAM

## 2023-07-18 PROCEDURE — 99999 PR PBB SHADOW E&M-EST. PATIENT-LVL IV: ICD-10-PCS | Mod: PBBFAC,,, | Performed by: STUDENT IN AN ORGANIZED HEALTH CARE EDUCATION/TRAINING PROGRAM

## 2023-07-18 PROCEDURE — 3008F PR BODY MASS INDEX (BMI) DOCUMENTED: ICD-10-PCS | Mod: CPTII,S$GLB,, | Performed by: STUDENT IN AN ORGANIZED HEALTH CARE EDUCATION/TRAINING PROGRAM

## 2023-07-18 PROCEDURE — 36415 COLL VENOUS BLD VENIPUNCTURE: CPT | Mod: PO | Performed by: STUDENT IN AN ORGANIZED HEALTH CARE EDUCATION/TRAINING PROGRAM

## 2023-07-18 PROCEDURE — 1159F MED LIST DOCD IN RCRD: CPT | Mod: CPTII,S$GLB,, | Performed by: STUDENT IN AN ORGANIZED HEALTH CARE EDUCATION/TRAINING PROGRAM

## 2023-07-18 PROCEDURE — 1160F PR REVIEW ALL MEDS BY PRESCRIBER/CLIN PHARMACIST DOCUMENTED: ICD-10-PCS | Mod: CPTII,S$GLB,, | Performed by: STUDENT IN AN ORGANIZED HEALTH CARE EDUCATION/TRAINING PROGRAM

## 2023-07-18 PROCEDURE — 99999 PR PBB SHADOW E&M-EST. PATIENT-LVL IV: CPT | Mod: PBBFAC,,, | Performed by: STUDENT IN AN ORGANIZED HEALTH CARE EDUCATION/TRAINING PROGRAM

## 2023-07-18 PROCEDURE — 3044F PR MOST RECENT HEMOGLOBIN A1C LEVEL <7.0%: ICD-10-PCS | Mod: CPTII,S$GLB,, | Performed by: STUDENT IN AN ORGANIZED HEALTH CARE EDUCATION/TRAINING PROGRAM

## 2023-07-18 PROCEDURE — 99215 PR OFFICE/OUTPT VISIT, EST, LEVL V, 40-54 MIN: ICD-10-PCS | Mod: S$GLB,,, | Performed by: STUDENT IN AN ORGANIZED HEALTH CARE EDUCATION/TRAINING PROGRAM

## 2023-07-18 PROCEDURE — 1159F PR MEDICATION LIST DOCUMENTED IN MEDICAL RECORD: ICD-10-PCS | Mod: CPTII,S$GLB,, | Performed by: STUDENT IN AN ORGANIZED HEALTH CARE EDUCATION/TRAINING PROGRAM

## 2023-07-18 RX ORDER — BUPROPION HYDROCHLORIDE 150 MG/1
150 TABLET ORAL DAILY
Qty: 30 TABLET | Refills: 11 | Status: SHIPPED | OUTPATIENT
Start: 2023-07-18 | End: 2024-07-17

## 2023-07-18 RX ORDER — ASFOTASE ALFA 80 MG/.8ML
80 SOLUTION SUBCUTANEOUS
COMMUNITY
End: 2023-12-13 | Stop reason: SDUPTHER

## 2023-07-18 RX ORDER — MOMETASONE FUROATE 1 MG/G
CREAM TOPICAL
COMMUNITY
Start: 2023-01-26 | End: 2023-07-18

## 2023-07-18 NOTE — PROGRESS NOTES
SUBJECTIVE:    CHIEF COMPLAINT:   Chief Complaint   Patient presents with    Establish Care     depression             274}    HISTORY OF PRESENT ILLNESS:  Munira Ocasio is a 22 y.o. female with past medical history of ADHD, chronic fatigue who presents to the clinic today to establish care.  She reports that she is had feelings of increased fatigue, hypersomnia, difficulties with concentration.  Ongoing for the past 6 months.  She does not report any particular event that has caused her symptoms but feels that she does need to be evaluated further.  She denies any shortness a breath, chest pain, abdominal pain, nausea, vomiting or diarrhea.      PAST MEDICAL HISTORY:     274}  Past Medical History:   Diagnosis Date    ADHD (attention deficit hyperactivity disorder)     Anxiety     Depression        PAST SURGICAL HISTORY:  History reviewed. No pertinent surgical history.    SOCIAL HISTORY:  Social History     Socioeconomic History    Marital status: Single   Tobacco Use    Smoking status: Never     Passive exposure: Yes    Smokeless tobacco: Never   Substance and Sexual Activity    Alcohol use: Yes     Comment: occasionally    Drug use: Never    Sexual activity: Yes     Partners: Female       FAMILY HISTORY:       Family History   Problem Relation Age of Onset    Collagen disease Neg Hx        ALLERGIES AND MEDICATIONS: updated and reviewed.      274}  Review of patient's allergies indicates:  No Known Allergies  Medication List with Changes/Refills   New Medications    BUPROPION (WELLBUTRIN XL) 150 MG TB24 TABLET    Take 1 tablet (150 mg total) by mouth once daily.   Current Medications    ASFOTASE LOY (STRENSIQ) 80 MG/0.8 ML SOLN    Inject 80 mg into the skin 3 (three) times a week.    BUSPIRONE HCL (BUSPAR ORAL)    Take 5 mg by mouth as needed.   Discontinued Medications    ALBUTEROL-IPRATROPIUM (DUO-NEB) 2.5 MG-0.5 MG/3 ML NEBULIZER SOLUTION    Take 3 mLs by nebulization every 6 (six) hours as needed for  "Wheezing. Rescue    ASFOTASE LOY (STRENSIQ) 80 MG/0.8 ML SOLN    Inject 80 mg into the skin 3 (three) times a week.    AZITHROMYCIN (Z-MATILDA) 250 MG TABLET    Take 250 mg by mouth.    BUSPIRONE (BUSPAR) 5 MG TAB        FLUOXETINE 10 MG CAPSULE    Take 10 mg by mouth once daily.    FLUOXETINE 20 MG CAPSULE    Take 20 mg by mouth once daily.    KETOROLAC (TORADOL) 10 MG TABLET    Take 10 mg by mouth every 6 (six) hours as needed.    LORATADINE (CLARITIN) 10 MG TABLET    Take 10 mg by mouth once daily.    METHYLPHENIDATE (RITALIN) 5 MG TABLET    Take 5 mg by mouth 2 (two) times daily.    MOMETASONE 0.1% (ELOCON) 0.1 % CREAM    Apply topically.    PEN NEEDLE, DIABETIC 32 GAUGE X 5/32" NDLE    1 Units by Misc.(Non-Drug; Combo Route) route once a week.    PROMETHAZINE-DEXTROMETHORPHAN (PROMETHAZINE-DM) 6.25-15 MG/5 ML SYRP    Take by mouth.    SYRINGE WITH NEEDLE 1 ML 25 GAUGE X 1" SYRG    1 Units by Misc.(Non-Drug; Combo Route) route once a week.    XOFLUZA 40 MG TABLET    Take by mouth daily as needed.       SCREENING HISTORY:    274}  Health Maintenance         Date Due Completion Date    COVID-19 Vaccine (1) Never done ---    Chlamydia Screening 04/12/2022 4/12/2021    Influenza Vaccine (1) 09/01/2023 9/16/2020    TETANUS VACCINE 10/07/2023 10/7/2013            REVIEW OF SYSTEMS:   Review of Systems   Constitutional:  Negative for activity change, diaphoresis, fatigue, fever and unexpected weight change.   HENT:  Negative for postnasal drip and rhinorrhea.    Eyes:  Negative for photophobia and visual disturbance.   Respiratory:  Negative for cough, shortness of breath and wheezing.    Cardiovascular:  Negative for chest pain, palpitations and leg swelling.   Gastrointestinal:  Negative for abdominal distention, abdominal pain, constipation, diarrhea, nausea and vomiting.   Genitourinary:  Negative for bladder incontinence, difficulty urinating and frequency.   Musculoskeletal:  Negative for joint swelling and leg " "pain.   Integumentary:  Negative for pallor and rash.   Neurological:  Negative for dizziness, weakness and numbness.     PHYSICAL EXAM:      274}  BP 94/62 (BP Location: Right arm, Patient Position: Sitting, BP Method: Medium (Manual))   Pulse (!) 59   Temp 99.2 °F (37.3 °C)   Ht 5' 1" (1.549 m)   Wt 54.2 kg (119 lb 7.8 oz)   LMP 11/15/2022   SpO2 99%   BMI 22.58 kg/m²   Wt Readings from Last 3 Encounters:   07/18/23 54.2 kg (119 lb 7.8 oz)   06/30/23 53.3 kg (117 lb 8.1 oz)   01/03/23 53.5 kg (117 lb 15.1 oz)     BP Readings from Last 3 Encounters:   07/18/23 94/62   06/30/23 100/70   01/03/23 112/60     Estimated body mass index is 22.58 kg/m² as calculated from the following:    Height as of this encounter: 5' 1" (1.549 m).    Weight as of this encounter: 54.2 kg (119 lb 7.8 oz).     Physical Exam  Constitutional:       General: She is not in acute distress.     Appearance: Normal appearance. She is well-developed and normal weight. She is not ill-appearing.   HENT:      Head: Normocephalic and atraumatic.      Right Ear: External ear normal.      Left Ear: External ear normal.      Nose: Nose normal.   Eyes:      Extraocular Movements: Extraocular movements intact.      Conjunctiva/sclera: Conjunctivae normal.      Pupils: Pupils are equal, round, and reactive to light.   Neck:      Thyroid: No thyroid mass.   Cardiovascular:      Rate and Rhythm: Normal rate and regular rhythm.      Pulses: Normal pulses.      Heart sounds: Normal heart sounds, S1 normal and S2 normal. No murmur heard.    No gallop.   Pulmonary:      Effort: Pulmonary effort is normal. No respiratory distress.      Breath sounds: Normal breath sounds and air entry. No stridor. No wheezing, rhonchi or rales.   Abdominal:      General: Bowel sounds are normal. There is no distension.      Palpations: Abdomen is soft. There is no mass.      Tenderness: There is no abdominal tenderness.   Musculoskeletal:         General: No swelling or " deformity. Normal range of motion.      Cervical back: Normal range of motion and neck supple. No edema or erythema.   Skin:     General: Skin is warm and dry.      Findings: No lesion or rash.   Neurological:      General: No focal deficit present.      Mental Status: She is alert and oriented to person, place, and time. Mental status is at baseline.   Psychiatric:         Mood and Affect: Mood is depressed.         Speech: Speech normal.         Behavior: Behavior is withdrawn. Behavior is cooperative.         Judgment: Judgment normal.       LABS:   274}  I have reviewed old labs below:  Lab Results   Component Value Date    WBC 5.46 06/30/2023    HGB 12.8 06/30/2023    HCT 39.3 06/30/2023    MCV 88 06/30/2023     06/30/2023     05/18/2023    K 4.0 05/18/2023     05/18/2023    CALCIUM 9.2 05/18/2023    PHOS 3.5 06/30/2023    CO2 26 05/18/2023    GLU 78 05/18/2023    BUN 14 05/18/2023    CREATININE 0.7 05/18/2023    ANIONGAP 7 (L) 05/18/2023    ESTGFRAFRICA >60.0 05/11/2022    EGFRNONAA >60.0 05/11/2022    PROT 6.6 05/18/2023    ALBUMIN 4.2 05/18/2023    BILITOT 0.5 05/18/2023    ALKPHOS 1,630 (H) 05/18/2023    ALT 10 05/18/2023    AST 17 05/18/2023    CHOL 110 (L) 06/30/2023    TRIG 45 06/30/2023    HDL 59 06/30/2023    LDLCALC 42.0 (L) 06/30/2023    TSH 0.842 05/11/2022    HGBA1C 4.7 06/30/2023       ASSESSMENT AND PLAN:  274}  1. Elevated alkaline phosphatase level  Comments:  Abnormal alk phos noted on 06/30/2023 labs.  repeat hepatitis panel function panel, check GGT.     Overview:  Abnormal alk phos noted on 06/30/2023 labs. >[id number]        Assessment & Plan:  repeat hepatitis panel function panel, check GGT.   Will, consider liver ultrasound if still elevated, and referral to hepatology    Orders:  -     Hepatic Function Panel; Future; Expected date: 10/17/2023  -     GAMMA GT; Future; Expected date: 07/18/2023    2. Moderate episode of recurrent major depressive disorder  Comments:  PHQ9:  10 pts.   Orders:  -     Ambulatory referral/consult to Psychiatry; Future; Expected date: 07/25/2023  -     buPROPion (WELLBUTRIN XL) 150 MG TB24 tablet; Take 1 tablet (150 mg total) by mouth once daily.  Dispense: 30 tablet; Refill: 11    3. Positive depression screening  Comments:  I have reviewed the positive depression score which warrants active treatment with psychotherapy and/or medications.           Orders Placed This Encounter   Procedures    Hepatic Function Panel    GAMMA GT    Ambulatory referral/consult to Psychiatry         Follow up in about 4 months (around 11/18/2023). or sooner as needed.        I spent a total of 41 minutes on the day of the visit.  This includes face to face time and non-face to face time preparing to see the patient (eg, review of tests), obtaining and/or reviewing separately obtained history, documenting clinical information in the electronic or other health record, independently interpreting results and communicating results to the patient/family/caregiver, or care coordinator.

## 2023-07-18 NOTE — ASSESSMENT & PLAN NOTE
repeat hepatitis panel function panel, check GGT.   Will, consider liver ultrasound if still elevated, and referral to hepatology

## 2023-07-18 NOTE — PATIENT INSTRUCTIONS
Mc Lombardo,     If you are due for any health screening(s) below please notify me so we can arrange them to be ordered and scheduled to maintain your health. Most healthy patients complete it. Don't lose out on improving your health.     All of your core healthy metrics are met.

## 2023-07-20 DIAGNOSIS — R74.8 ELEVATED ALKALINE PHOSPHATASE LEVEL: Primary | ICD-10-CM

## 2023-07-21 ENCOUNTER — TELEPHONE (OUTPATIENT)
Dept: ENDOCRINOLOGY | Facility: CLINIC | Age: 23
End: 2023-07-21
Payer: COMMERCIAL

## 2023-07-28 ENCOUNTER — TELEPHONE (OUTPATIENT)
Dept: FAMILY MEDICINE | Facility: CLINIC | Age: 23
End: 2023-07-28
Payer: COMMERCIAL

## 2023-07-28 NOTE — TELEPHONE ENCOUNTER
Spoke to Abi   She is needing Endo dept   She will contact Elo Rdz LPN for assistance        ----- Message from Mago Stover sent at 7/28/2023 11:23 AM CDT -----  Contact: Abi from [facility]  Type:  Pharmacy Calling to Clarify an RX    Name of Caller:  Abi    Pharmacy Name:      [facility] - [place] [address]  Phone: [phone] Fax: [phone]    Prescription Name:   asfotase tray (STRENSIQ) 80 mg/0.8 mL Soln    What do they need to clarify?:  Denial    Best Call Back Number:  [phone]  Ext [phone]    Additional Information: States she needs to speak with someone in the office about the denial and next steps - please call to advise - thank you

## 2023-08-21 ENCOUNTER — TELEPHONE (OUTPATIENT)
Dept: ENDOCRINOLOGY | Facility: CLINIC | Age: 23
End: 2023-08-21
Payer: COMMERCIAL

## 2023-08-21 NOTE — TELEPHONE ENCOUNTER
Received notice from [facility] stating approval for Strensiq.  Approval dates are 08/28/23 to 02/28/24.    Spoke with Manuel regarding PA for [facility].  Explained Dr. Arellano is the PCP.  Manuel explained about the PA needing appeal but upon further research she will be able to send the PA again with the most recent Vitamin B6 profile lab results.  Faxed results to [facility] PA team @ [phone].    Called and left vm for Manuel to call back.    ----- Message from Denita Mcdermott LPN sent at 8/17/2023  4:58 PM CDT -----  Regarding: RE: advise  Contact: [facility]  Yes ma'am this was a message   ----- Message -----  From: Elo Rdz LPN  Sent: 8/17/2023   4:32 PM CDT  To: Denita Mcdermott LPN  Subject: RE: advise                                       I can.  Are they still on the phone or do I need to call them back?  ----- Message -----  From: Denita Mcdermott LPN  Sent: 8/17/2023   4:27 PM CDT  To: Elo Rdz LPN  Subject: FW: advise                                       This came to Toño Arellano in error   Can you help her?   Thank you  ----- Message -----  From: Keren Prajapati  Sent: 8/17/2023   3:24 PM CDT  To: Adan BUCHANAN Staff  Subject: advise                                           Type: Needs Medical Advice  Who Called:  [facility]   Symptoms (please be specific):    How long has patient had these symptoms:    Pharmacy name and phone #:    Best Call Back Number: [phone] ext [phone] manuel  Additional Information:   Mc Gasca, I have [facility] regarding pt Munira Ocasio MRN: [id number] are you available?

## 2023-08-22 ENCOUNTER — TELEPHONE (OUTPATIENT)
Dept: FAMILY MEDICINE | Facility: CLINIC | Age: 23
End: 2023-08-22
Payer: COMMERCIAL

## 2023-08-22 NOTE — TELEPHONE ENCOUNTER
----- Message from Nyla Gibson sent at 8/22/2023  8:08 AM CDT -----  Contact: manuel  Type:  Patient Returning Call    Who Called:  manuel kincaid/juan antonio [facility]  Who Left Message for Patient:  sonja  Does the patient know what this is regarding?:  yes  Best Call Back Number:  [phone] ext [phone]  Additional Information:  please call

## 2023-08-25 ENCOUNTER — TELEPHONE (OUTPATIENT)
Dept: FAMILY MEDICINE | Facility: CLINIC | Age: 23
End: 2023-08-25
Payer: COMMERCIAL

## 2023-08-25 NOTE — TELEPHONE ENCOUNTER
Message sent to PCP in error.  Will forward message to JOEL Rdz. Please refer to telephone encounter dated 8-21-23 for further details.

## 2023-08-25 NOTE — TELEPHONE ENCOUNTER
----- Message from Zoya Graf MA sent at 8/25/2023 10:50 AM CDT -----  Type:  Patient Returning Call    Who Called:  JANENE Choi from [facility]   Who Left Message for Patient:  Elo  Does the patient know what this is regarding?:  no  Best Call Back Number:  [phone] ext [phone]  Additional Information:  please advise

## 2023-10-04 NOTE — PROGRESS NOTES
"FOLLOW-UP PATIENT VISIT    Subjective:      Chief Complaint: Consult      HPI: Munira Ocasio is a 23 y.o. female who is here for  hypophosphatasia.  Last clinic visit with Dr. Salvador 6/30/2023.  Diagnosed [id number].  Patient comes in with her mother who also has hypophosphatasia and her moms sister has it as well.  She reports early tooth loss, brain fog, lack of energy and decreased mobility.  She is taking Strensiq but she has not been able to get it consistently through her insurance. She reports improvement in brain fog and feels better overall, especially the days immediately following the injection. She did initially have issues with injection site reactions but reports resolution of these issues. She denies any complaints with her sleep.    Lab Results   Component Value Date    ALKPHOS 1,180 (H) 07/18/2023    ALKPHOS 1,630 (H) 05/18/2023    ALKPHOS 27 (L) 12/06/2022    ALKPHOS 29 08/27/2022    ALKPHOS 26 (L) 05/11/2022    ALKPHOS 26 (L) 11/13/2021    ALKPHOS 28 (L) 03/13/2021     Lab Results   Component Value Date    VITAMINB6 62 (H) 06/22/2023    VITAMINB6 83 (H) 05/11/2022     Last DXA: 02/24/2023      ROS: See HPI    Past Medical History:   Diagnosis Date    ADHD (attention deficit hyperactivity disorder)     Anxiety     Depression      History reviewed. No pertinent surgical history.    Reviewed past medical, family, social history and updated as appropriate.    Objective:     BP 98/64 (BP Location: Right arm, Patient Position: Sitting, BP Method: Medium (Manual))   Pulse (!) 52   Ht 5' 1" (1.549 m)   Wt 52.3 kg (115 lb 3.1 oz)   SpO2 99%   BMI 21.77 kg/m²     BP Readings from Last 5 Encounters:   10/05/23 98/64   07/18/23 94/62   06/30/23 100/70   01/03/23 112/60   12/08/22 (!) 92/56       Body mass index is 21.77 kg/m².    Wt Readings from Last 3 Encounters:   10/05/23 0906 52.3 kg (115 lb 3.1 oz)   07/18/23 1110 54.2 kg (119 lb 7.8 oz)   06/30/23 1505 53.3 kg (117 lb 8.1 oz)       Physical " "Exam    Lab Review:   Lab Results   Component Value Date    HGBA1C 4.7 06/30/2023     Lab Results   Component Value Date    CHOL 110 (L) 06/30/2023    HDL 59 06/30/2023    LDLCALC 42.0 (L) 06/30/2023    TRIG 45 06/30/2023    CHOLHDL 53.6 (H) 06/30/2023     Lab Results   Component Value Date     05/18/2023    K 4.0 05/18/2023     05/18/2023    CO2 26 05/18/2023    GLU 78 05/18/2023    BUN 14 05/18/2023    CREATININE 0.7 05/18/2023    CALCIUM 9.2 05/18/2023    PROT 6.5 07/18/2023    ALBUMIN 4.1 07/18/2023    BILITOT 0.4 07/18/2023    ALKPHOS 1,180 (H) 07/18/2023    AST 14 07/18/2023    ALT 9 (L) 07/18/2023    ANIONGAP 7 (L) 05/18/2023    ESTGFRAFRICA >60.0 05/11/2022    EGFRNONAA >60.0 05/11/2022    TSH 0.842 05/11/2022     No results found for: "[id number]"  Lab Results   Component Value Date    WBC 5.46 06/30/2023    HGB 12.8 06/30/2023    HCT 39.3 06/30/2023    MCV 88 06/30/2023     06/30/2023         Assessment/Plan:     Hypophosphatasia  Taking strensiq, 80 mg injections three times a week with good results. Reporting significant improvement in mood, brain fog and strength. Continue treatment at current dose.  Needs yearly ophthalmology exams and renal ultrasounds as strensiq can cause ectopic calcifications in the eyes and kidney  - check CMP with [facility] to evaluate alk phos levels now  - next renal ultrasound due in February 2023  Discussed proper injection technique and rotating sites, known side effects of lipodystrophy at injection site.    RTC 4-6 months after ultrasound result    Osteopenia  DEXA 02/2023  - Left femoral neck Z score -1.3  - L1-L4: 0.6 with -3.7% statistically significant change from 2021  1.9% risk of major osteoporotic fracture and 0.1% at the hip next 10 years    Repeat DEXA: 2025    Elevated alkaline phosphatase level  Likely an effect of medication  Continue to monitor    Hypovitaminosis D  Recent vitamin D in the reference range but has been low in the past  She is " not taking vitamin D supplements  - Check vitamin D level, may need to start replacement      Follow up in about 6 months (around 4/5/2024).

## 2023-10-05 ENCOUNTER — OFFICE VISIT (OUTPATIENT)
Dept: ENDOCRINOLOGY | Facility: CLINIC | Age: 23
End: 2023-10-05
Payer: COMMERCIAL

## 2023-10-05 ENCOUNTER — LAB VISIT (OUTPATIENT)
Dept: LAB | Facility: HOSPITAL | Age: 23
End: 2023-10-05
Payer: COMMERCIAL

## 2023-10-05 VITALS
WEIGHT: 115.19 LBS | HEIGHT: 61 IN | SYSTOLIC BLOOD PRESSURE: 98 MMHG | BODY MASS INDEX: 21.75 KG/M2 | DIASTOLIC BLOOD PRESSURE: 64 MMHG | OXYGEN SATURATION: 99 % | HEART RATE: 52 BPM

## 2023-10-05 DIAGNOSIS — R74.8 ELEVATED ALKALINE PHOSPHATASE LEVEL: ICD-10-CM

## 2023-10-05 DIAGNOSIS — M85.80 OSTEOPENIA, UNSPECIFIED LOCATION: Primary | ICD-10-CM

## 2023-10-05 DIAGNOSIS — E83.39 HYPOPHOSPHATASIA: ICD-10-CM

## 2023-10-05 DIAGNOSIS — E55.9 HYPOVITAMINOSIS D: ICD-10-CM

## 2023-10-05 LAB
25(OH)D3+25(OH)D2 SERPL-MCNC: 32 NG/ML (ref 30–96)
ALBUMIN SERPL BCP-MCNC: 4.4 G/DL (ref 3.5–5.2)
ALP SERPL-CCNC: 432 U/L (ref 55–135)
ALT SERPL W/O P-5'-P-CCNC: 10 U/L (ref 10–44)
ANION GAP SERPL CALC-SCNC: 5 MMOL/L (ref 8–16)
AST SERPL-CCNC: 14 U/L (ref 10–40)
BILIRUB SERPL-MCNC: 0.4 MG/DL (ref 0.1–1)
BUN SERPL-MCNC: 10 MG/DL (ref 6–20)
CALCIUM SERPL-MCNC: 9.4 MG/DL (ref 8.7–10.5)
CHLORIDE SERPL-SCNC: 106 MMOL/L (ref 95–110)
CO2 SERPL-SCNC: 28 MMOL/L (ref 23–29)
CREAT SERPL-MCNC: 0.8 MG/DL (ref 0.5–1.4)
EST. GFR  (NO RACE VARIABLE): >60 ML/MIN/1.73 M^2
GLUCOSE SERPL-MCNC: 85 MG/DL (ref 70–110)
PHOSPHATE SERPL-MCNC: 4.6 MG/DL (ref 2.7–4.5)
POTASSIUM SERPL-SCNC: 3.8 MMOL/L (ref 3.5–5.1)
PROT SERPL-MCNC: 7 G/DL (ref 6–8.4)
SODIUM SERPL-SCNC: 139 MMOL/L (ref 136–145)

## 2023-10-05 PROCEDURE — 1159F MED LIST DOCD IN RCRD: CPT | Mod: CPTII,S$GLB,, | Performed by: STUDENT IN AN ORGANIZED HEALTH CARE EDUCATION/TRAINING PROGRAM

## 2023-10-05 PROCEDURE — 99999 PR PBB SHADOW E&M-EST. PATIENT-LVL IV: ICD-10-PCS | Mod: PBBFAC,,, | Performed by: STUDENT IN AN ORGANIZED HEALTH CARE EDUCATION/TRAINING PROGRAM

## 2023-10-05 PROCEDURE — 99999 PR PBB SHADOW E&M-EST. PATIENT-LVL IV: CPT | Mod: PBBFAC,,, | Performed by: STUDENT IN AN ORGANIZED HEALTH CARE EDUCATION/TRAINING PROGRAM

## 2023-10-05 PROCEDURE — 1159F PR MEDICATION LIST DOCUMENTED IN MEDICAL RECORD: ICD-10-PCS | Mod: CPTII,S$GLB,, | Performed by: STUDENT IN AN ORGANIZED HEALTH CARE EDUCATION/TRAINING PROGRAM

## 2023-10-05 PROCEDURE — 82306 VITAMIN D 25 HYDROXY: CPT | Performed by: STUDENT IN AN ORGANIZED HEALTH CARE EDUCATION/TRAINING PROGRAM

## 2023-10-05 PROCEDURE — 80053 COMPREHEN METABOLIC PANEL: CPT | Performed by: STUDENT IN AN ORGANIZED HEALTH CARE EDUCATION/TRAINING PROGRAM

## 2023-10-05 PROCEDURE — 3044F PR MOST RECENT HEMOGLOBIN A1C LEVEL <7.0%: ICD-10-PCS | Mod: CPTII,S$GLB,, | Performed by: STUDENT IN AN ORGANIZED HEALTH CARE EDUCATION/TRAINING PROGRAM

## 2023-10-05 PROCEDURE — 3078F DIAST BP <80 MM HG: CPT | Mod: CPTII,S$GLB,, | Performed by: STUDENT IN AN ORGANIZED HEALTH CARE EDUCATION/TRAINING PROGRAM

## 2023-10-05 PROCEDURE — 3074F PR MOST RECENT SYSTOLIC BLOOD PRESSURE < 130 MM HG: ICD-10-PCS | Mod: CPTII,S$GLB,, | Performed by: STUDENT IN AN ORGANIZED HEALTH CARE EDUCATION/TRAINING PROGRAM

## 2023-10-05 PROCEDURE — 84100 ASSAY OF PHOSPHORUS: CPT | Performed by: STUDENT IN AN ORGANIZED HEALTH CARE EDUCATION/TRAINING PROGRAM

## 2023-10-05 PROCEDURE — 3008F BODY MASS INDEX DOCD: CPT | Mod: CPTII,S$GLB,, | Performed by: STUDENT IN AN ORGANIZED HEALTH CARE EDUCATION/TRAINING PROGRAM

## 2023-10-05 PROCEDURE — 36415 COLL VENOUS BLD VENIPUNCTURE: CPT | Performed by: STUDENT IN AN ORGANIZED HEALTH CARE EDUCATION/TRAINING PROGRAM

## 2023-10-05 PROCEDURE — 3078F PR MOST RECENT DIASTOLIC BLOOD PRESSURE < 80 MM HG: ICD-10-PCS | Mod: CPTII,S$GLB,, | Performed by: STUDENT IN AN ORGANIZED HEALTH CARE EDUCATION/TRAINING PROGRAM

## 2023-10-05 PROCEDURE — 3074F SYST BP LT 130 MM HG: CPT | Mod: CPTII,S$GLB,, | Performed by: STUDENT IN AN ORGANIZED HEALTH CARE EDUCATION/TRAINING PROGRAM

## 2023-10-05 PROCEDURE — 3008F PR BODY MASS INDEX (BMI) DOCUMENTED: ICD-10-PCS | Mod: CPTII,S$GLB,, | Performed by: STUDENT IN AN ORGANIZED HEALTH CARE EDUCATION/TRAINING PROGRAM

## 2023-10-05 PROCEDURE — 99214 OFFICE O/P EST MOD 30 MIN: CPT | Mod: S$GLB,,, | Performed by: STUDENT IN AN ORGANIZED HEALTH CARE EDUCATION/TRAINING PROGRAM

## 2023-10-05 PROCEDURE — 99214 PR OFFICE/OUTPT VISIT, EST, LEVL IV, 30-39 MIN: ICD-10-PCS | Mod: S$GLB,,, | Performed by: STUDENT IN AN ORGANIZED HEALTH CARE EDUCATION/TRAINING PROGRAM

## 2023-10-05 PROCEDURE — 3044F HG A1C LEVEL LT 7.0%: CPT | Mod: CPTII,S$GLB,, | Performed by: STUDENT IN AN ORGANIZED HEALTH CARE EDUCATION/TRAINING PROGRAM

## 2023-10-05 NOTE — ASSESSMENT & PLAN NOTE
DEXA 02/2023  - Left femoral neck Z score -1.3  - L1-L4: 0.6 with -3.7% statistically significant change from 2021  1.9% risk of major osteoporotic fracture and 0.1% at the hip next 10 years    Repeat DEXA: 2025

## 2023-10-05 NOTE — ASSESSMENT & PLAN NOTE
Recent vitamin D in the reference range but has been low in the past  She is not taking vitamin D supplements  - Check vitamin D level, may need to start replacement

## 2023-10-05 NOTE — ASSESSMENT & PLAN NOTE
Taking strensiq, 80 mg injections three times a week with good results. Reporting significant improvement in mood, brain fog and strength. Continue treatment at current dose.  Needs yearly ophthalmology exams and renal ultrasounds as strensiq can cause ectopic calcifications in the eyes and kidney  - check CMP with [facility] to evaluate alk phos levels now  - next renal ultrasound due in February 2023  Discussed proper injection technique and rotating sites, known side effects of lipodystrophy at injection site.    RTC 4-6 months after ultrasound result

## 2023-10-06 ENCOUNTER — PATIENT MESSAGE (OUTPATIENT)
Dept: ENDOCRINOLOGY | Facility: CLINIC | Age: 23
End: 2023-10-06
Payer: COMMERCIAL

## 2023-10-14 NOTE — PROGRESS NOTES
I have reviewed and concur with Dr. López 's history, physical, assessment, and plan.  I have personally interviewed and examined the patient.     Tamar Pal MD

## 2023-10-17 DIAGNOSIS — E83.39 HYPOPHOSPHATASIA: Primary | ICD-10-CM

## 2023-11-10 ENCOUNTER — TELEPHONE (OUTPATIENT)
Dept: FAMILY MEDICINE | Facility: CLINIC | Age: 23
End: 2023-11-10
Payer: COMMERCIAL

## 2023-11-10 NOTE — TELEPHONE ENCOUNTER
Katherine Elise Staff    ----- Message from Katherine Elise sent at 11/10/2023 12:06 PM CST -----  Regarding: refill  Contact: Christen with [facility]  Type:  RX Refill Request    Who Called:  Christen with [facility]  Refill or New Rx:  refill  RX Name and Strength:  asfotase tray (STRENSIQ) 80 mg/0.8 mL Soln  How is the patient currently taking it? (ex. 1XDay):  as directed  Is this a 30 day or 90 day RX:  90    [facility] - [place] [address]  Phone: [phone] Fax: [phone]      Local or Mail Order:  mail order  Ordering Provider:  Dr. Adan Liu Call Back Number:  [phone]  Additional Information:  Please call Christen to advise.  thanks

## 2023-11-10 NOTE — TELEPHONE ENCOUNTER
Received refill request for Asfotase Boy (Strensiq) from mail order pharmacy.  This medication is listed in Epic as a historical medication.  Call placed to patient to confirm dosage information prior to forwarding refill request to Dr. Arellano.  No answer received.  Left voicemail requesting return call to office.

## 2023-12-13 DIAGNOSIS — E83.39 HYPOPHOSPHATASIA: Primary | ICD-10-CM

## 2023-12-13 DIAGNOSIS — E83.39 ADULT HYPOPHOSPHATASIA: ICD-10-CM

## 2023-12-13 RX ORDER — ASFOTASE ALFA 80 MG/.8ML
80 SOLUTION SUBCUTANEOUS
Qty: 29 ML | Refills: 3 | Status: SHIPPED | OUTPATIENT
Start: 2023-12-13

## 2024-01-11 ENCOUNTER — TELEPHONE (OUTPATIENT)
Dept: ENDOCRINOLOGY | Facility: CLINIC | Age: 24
End: 2024-01-11
Payer: COMMERCIAL

## 2024-01-11 DIAGNOSIS — E83.39 HYPOPHOSPHATASIA: Primary | ICD-10-CM

## 2024-01-26 ENCOUNTER — OFFICE VISIT (OUTPATIENT)
Dept: URGENT CARE | Facility: CLINIC | Age: 24
End: 2024-01-26
Payer: COMMERCIAL

## 2024-01-26 VITALS
HEART RATE: 84 BPM | RESPIRATION RATE: 18 BRPM | BODY MASS INDEX: 21.71 KG/M2 | HEIGHT: 61 IN | OXYGEN SATURATION: 98 % | TEMPERATURE: 98 F | DIASTOLIC BLOOD PRESSURE: 61 MMHG | SYSTOLIC BLOOD PRESSURE: 134 MMHG | WEIGHT: 115 LBS

## 2024-01-26 DIAGNOSIS — R51.9 NONINTRACTABLE HEADACHE, UNSPECIFIED CHRONICITY PATTERN, UNSPECIFIED HEADACHE TYPE: ICD-10-CM

## 2024-01-26 DIAGNOSIS — L03.211 CELLULITIS OF FACE: Primary | ICD-10-CM

## 2024-01-26 DIAGNOSIS — T20.40XA CHEMICAL BURN OF FACE, INITIAL ENCOUNTER: ICD-10-CM

## 2024-01-26 PROCEDURE — 99204 OFFICE O/P NEW MOD 45 MIN: CPT | Mod: S$GLB,,,

## 2024-01-26 RX ORDER — BUTALBITAL, ACETAMINOPHEN AND CAFFEINE 50; 325; 40 MG/1; MG/1; MG/1
1 TABLET ORAL EVERY 4 HOURS PRN
Qty: 30 TABLET | Refills: 0 | Status: SHIPPED | OUTPATIENT
Start: 2024-01-26 | End: 2024-02-05

## 2024-01-26 RX ORDER — ONDANSETRON 4 MG/1
4 TABLET, ORALLY DISINTEGRATING ORAL EVERY 6 HOURS PRN
Qty: 20 TABLET | Refills: 0 | Status: SHIPPED | OUTPATIENT
Start: 2024-01-26

## 2024-01-26 RX ORDER — DOXYCYCLINE 100 MG/1
100 CAPSULE ORAL 2 TIMES DAILY
Qty: 14 CAPSULE | Refills: 0 | Status: SHIPPED | OUTPATIENT
Start: 2024-01-26 | End: 2024-02-02

## 2024-01-26 NOTE — LETTER
January 26, 2024      [facility]  [address]  [facility] [address]  Phone: [phone]       Patient: Munira Ocasio   YOB: 2000  Date of Visit: 01/26/2024    To Whom It May Concern:    Hayley Ocasio  was at [facility] on 01/26/2024. The patient may return to work/school on 1/27/24 with no restrictions. If you have any questions or concerns, or if I can be of further assistance, please do not hesitate to contact me.    Sincerely,    PRAVEEN Rueda

## 2024-01-27 NOTE — PATIENT INSTRUCTIONS
Take doxy with a full glass of water do not lie flat for 1 hour after.  Protect skin by using sunscreen and wear sunglasses.  He will be more susceptible to sunburns while taking doxy    Use non comedonal moisturizer    You may also do whole milk soaks until skin erythema improves.
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
## 2024-03-11 ENCOUNTER — PATIENT MESSAGE (OUTPATIENT)
Dept: ADMINISTRATIVE | Facility: HOSPITAL | Age: 24
End: 2024-03-11
Payer: COMMERCIAL

## 2024-03-16 NOTE — TELEPHONE ENCOUNTER
Dr. Salvador completing [id number]   [Time Spent: ___ minutes] : I have spent [unfilled] minutes of time on the encounter.

## 2024-04-04 ENCOUNTER — PATIENT MESSAGE (OUTPATIENT)
Dept: ADMINISTRATIVE | Facility: HOSPITAL | Age: 24
End: 2024-04-04
Payer: COMMERCIAL

## 2024-05-17 ENCOUNTER — PATIENT OUTREACH (OUTPATIENT)
Dept: ADMINISTRATIVE | Facility: HOSPITAL | Age: 24
End: 2024-05-17
Payer: COMMERCIAL

## 2024-05-17 NOTE — PROGRESS NOTES
Population Health Chart Review & Patient Outreach Details      Additional [facility] Health Notes:               Updates Requested / Reviewed:      Updated Care Coordination Note, Care Everywhere, , External Sources: [facility] and [facility], and Immunizations Reconciliation Completed or Queried: [facility] Topics Overdue:      [facility] Score: 1     Cervical Cancer Screening                       Health Maintenance Topic(s) Outreach Outcomes & Actions Taken:    Cervical Cancer Screening - Outreach Outcomes & Actions Taken  : External Records Uploaded & Care Team Updated if Applicable and patient outreach and chlamydia uploaded

## 2024-06-24 ENCOUNTER — PATIENT MESSAGE (OUTPATIENT)
Dept: ADMINISTRATIVE | Facility: HOSPITAL | Age: 24
End: 2024-06-24
Payer: COMMERCIAL

## 2024-06-24 ENCOUNTER — PATIENT OUTREACH (OUTPATIENT)
Dept: ADMINISTRATIVE | Facility: HOSPITAL | Age: 24
End: 2024-06-24
Payer: COMMERCIAL

## 2024-06-24 NOTE — PROGRESS NOTES
Population Health Chart Review & Patient Outreach Details      Additional [facility] Notes:               Updates Requested / Reviewed:      Updated Care Coordination Note, Care Everywhere, External Sources: [facility] and [facility], and Immunizations Reconciliation Completed or Queried: [facility] Topics Overdue:      [facility] Score: 1     Cervical Cancer Screening                       Health Maintenance Topic(s) Outreach Outcomes & Actions Taken:    Cervical Cancer Screening - Outreach Outcomes & Actions Taken  : pap smear already scheduled

## 2024-07-22 ENCOUNTER — OFFICE VISIT (OUTPATIENT)
Dept: OBSTETRICS AND GYNECOLOGY | Facility: CLINIC | Age: 24
End: 2024-07-22
Payer: COMMERCIAL

## 2024-07-22 ENCOUNTER — LAB VISIT (OUTPATIENT)
Dept: LAB | Facility: HOSPITAL | Age: 24
End: 2024-07-22
Attending: FAMILY MEDICINE
Payer: COMMERCIAL

## 2024-07-22 VITALS
HEIGHT: 61 IN | DIASTOLIC BLOOD PRESSURE: 78 MMHG | HEART RATE: 87 BPM | BODY MASS INDEX: 22.94 KG/M2 | WEIGHT: 121.5 LBS | SYSTOLIC BLOOD PRESSURE: 116 MMHG

## 2024-07-22 DIAGNOSIS — Z12.4 PAP SMEAR FOR CERVICAL CANCER SCREENING: ICD-10-CM

## 2024-07-22 DIAGNOSIS — N92.6 IRREGULAR MENSES: ICD-10-CM

## 2024-07-22 DIAGNOSIS — Z01.419 WELL WOMAN EXAM WITH ROUTINE GYNECOLOGICAL EXAM: Primary | ICD-10-CM

## 2024-07-22 LAB
ESTRADIOL SERPL-MCNC: 176 PG/ML
FSH SERPL-ACNC: 6.09 MIU/ML
LDH SERPL L TO P-CCNC: 139 U/L (ref 110–260)
PROLACTIN SERPL IA-MCNC: 12.6 NG/ML (ref 5.2–26.5)
TSH SERPL DL<=0.005 MIU/L-ACNC: 0.66 UIU/ML (ref 0.4–4)

## 2024-07-22 PROCEDURE — 99395 PREV VISIT EST AGE 18-39: CPT | Mod: S$GLB,,, | Performed by: FAMILY MEDICINE

## 2024-07-22 PROCEDURE — 88175 CYTOPATH C/V AUTO FLUID REDO: CPT | Performed by: FAMILY MEDICINE

## 2024-07-22 PROCEDURE — 87491 CHLMYD TRACH DNA AMP PROBE: CPT | Performed by: FAMILY MEDICINE

## 2024-07-22 PROCEDURE — 84270 ASSAY OF SEX HORMONE GLOBUL: CPT | Performed by: FAMILY MEDICINE

## 2024-07-22 PROCEDURE — 3074F SYST BP LT 130 MM HG: CPT | Mod: CPTII,S$GLB,, | Performed by: FAMILY MEDICINE

## 2024-07-22 PROCEDURE — 3008F BODY MASS INDEX DOCD: CPT | Mod: CPTII,S$GLB,, | Performed by: FAMILY MEDICINE

## 2024-07-22 PROCEDURE — 99999 PR PBB SHADOW E&M-EST. PATIENT-LVL III: CPT | Mod: PBBFAC,,, | Performed by: FAMILY MEDICINE

## 2024-07-22 PROCEDURE — 84443 ASSAY THYROID STIM HORMONE: CPT | Performed by: FAMILY MEDICINE

## 2024-07-22 PROCEDURE — 1160F RVW MEDS BY RX/DR IN RCRD: CPT | Mod: CPTII,S$GLB,, | Performed by: FAMILY MEDICINE

## 2024-07-22 PROCEDURE — 82040 ASSAY OF SERUM ALBUMIN: CPT | Performed by: FAMILY MEDICINE

## 2024-07-22 PROCEDURE — 83615 LACTATE (LD) (LDH) ENZYME: CPT | Performed by: FAMILY MEDICINE

## 2024-07-22 PROCEDURE — 83001 ASSAY OF GONADOTROPIN (FSH): CPT | Performed by: FAMILY MEDICINE

## 2024-07-22 PROCEDURE — 87591 N.GONORRHOEAE DNA AMP PROB: CPT | Performed by: FAMILY MEDICINE

## 2024-07-22 PROCEDURE — 82670 ASSAY OF TOTAL ESTRADIOL: CPT | Performed by: FAMILY MEDICINE

## 2024-07-22 PROCEDURE — 83498 ASY HYDROXYPROGESTERONE 17-D: CPT | Performed by: FAMILY MEDICINE

## 2024-07-22 PROCEDURE — 84403 ASSAY OF TOTAL TESTOSTERONE: CPT | Performed by: FAMILY MEDICINE

## 2024-07-22 PROCEDURE — 1159F MED LIST DOCD IN RCRD: CPT | Mod: CPTII,S$GLB,, | Performed by: FAMILY MEDICINE

## 2024-07-22 PROCEDURE — 84146 ASSAY OF PROLACTIN: CPT | Performed by: FAMILY MEDICINE

## 2024-07-22 PROCEDURE — 3078F DIAST BP <80 MM HG: CPT | Mod: CPTII,S$GLB,, | Performed by: FAMILY MEDICINE

## 2024-07-22 NOTE — PROGRESS NOTES
"  HISTORY OF THE PRESENT ILLNESS    2024  Munira Ocasio is a 23 y.o. here for Annual Exam  . She complains of irregular light periods. Reports period about every 4 months, mainly brown old blood.    G'sP's:   LMP: No LMP recorded (lmp unknown).  Relationship: long-term relationship and same sex partner  Contraception: same-sex relationship    PAP'S: not previously tested  PAP: collected today  No results found for this or any previous visit.    HPV Vaccine: complete       LABS & RADS   Lab Results   Component Value Date    WBC 5.46 2023    HGB 12.8 2023    HCT 39.3 2023     2023    MCV 88 2023      Lab Results   Component Value Date    TSH 0.842 2022    LABLH 14.2 2022    FSH 6.04 2022    ESTRADIOL 28 2022      No results found for: "LABURIN"  Lab Results   Component Value Date    HEPCAB Negative 2021    [id number] Negative 2021          GYNECOLOGIC HISTORY      Genital HSV: no  STD Hx: no past history    Menarche: 18  Period duration: 2-5 days  # Heavy Days: none, mainly brown/old blood  Period frequency:irregular        OBSTETRIC HISTORY  OB History    Para Term  AB Living   0 0 0 0 0 0   SAB IAB Ectopic Multiple Live Births   0 0 0 0 0       PAST MEDICAL HISTORY  -------------------------------------    ADHD (attention deficit hyperactivity disorder)    Anxiety    Depression         PAST SURGICAL HISTORY  No past surgical history on file.      ALLERGIES  Review of patient's allergies indicates:  No Known Allergies    MEDICATIONS  Current Outpatient Medications   Medication Instructions    buPROPion (WELLBUTRIN XL) 150 mg, Oral, Daily    buspirone HCl (BUSPAR ORAL) 5 mg, Oral, As needed (PRN)       SOCIAL HISTORY  Lives with: partner  Smoker: non-smoker   Alcohol: yes, socially   Drugs: denies  Domestic Violence: no  Occupation:  works at [employer]      FAMILY HISTORY  BLEEDING or  CLOTTING DISORDERS: " none  BREAST CA: none  UTERINE CA: none  OVARIAN CA: none  COLON CA: none    REVIEW OF SYSTEMS  Review of Systems   Constitutional:  Negative for activity change, appetite change, chills, diaphoresis and fever.   Respiratory:  Negative for apnea, cough, chest tightness, shortness of breath and wheezing.    Cardiovascular:  Negative for chest pain and leg swelling.   Genitourinary:  Negative for difficulty urinating, dysuria, flank pain, frequency, menstrual problem, pelvic pain, vaginal bleeding, vaginal discharge and vaginal pain.   Skin:  Negative for color change, rash and wound.   Neurological:  Negative for dizziness.       --------------------------------------------------------------------------------------------------------------    PHYSICAL EXAM  VITALS:  Vitals:    07/22/24 1401   BP: 116/78   Pulse: 87       BREASTS = deferred, no concerns      Physical Exam  Vitals reviewed. Exam conducted with a chaperone present.   Constitutional:       Appearance: Normal appearance. She is normal weight.   HENT:      Head: Normocephalic and atraumatic.      Nose: Nose normal.      Mouth/Throat:      Mouth: Mucous membranes are moist.   Eyes:      Conjunctiva/sclera: Conjunctivae normal.      Pupils: Pupils are equal, round, and reactive to light.   Cardiovascular:      Rate and Rhythm: Normal rate and regular rhythm.   Abdominal:      General: Abdomen is flat. There is no distension.      Palpations: Abdomen is soft.      Tenderness: There is no abdominal tenderness.   Genitourinary:     General: Normal vulva.      Exam position: Lithotomy position.      Vagina: Normal.      Cervix: Normal.      Uterus: Normal.       Rectum: Normal.   Musculoskeletal:         General: Normal range of motion.      Cervical back: Normal range of motion.   Skin:     General: Skin is warm and dry.   Neurological:      General: No focal deficit present.      Mental Status: She is alert and oriented to person, place, and time.    Psychiatric:         Mood and Affect: Mood normal.         Behavior: Behavior normal.         ASSESSMENT AND PLAN:  Munira Ocasio 23 y.o.   Munira was seen today for annual exam.    Diagnoses and all orders for this visit:    Well woman exam with routine gynecological exam  -     Liquid-Based Pap Smear, Screening  -     C. trachomatis/N. gonorrhoeae by AMP DNA [facility]; Cervix    Pap smear for cervical cancer screening  -     Liquid-Based Pap Smear, Screening    Irregular menses  -     Estradiol; Future  -     Prolactin; Future  -     Lactate Dehydrogenase; Future  -     Testosterone Panel; Future  -     17-Hydroxyprogesterone; Future  -     Follicle Stimulating Hormone; Future  -     TSH; Future          Cervical Cancer screening: f/u results of PAP --> if negative then next screen in 3 yrs  HPV Vaccine: complete    Encouraged self breast awareness; RTC for breast concerns    RTC for periodic GYN exam, sooner prn      Hortencia Griffing FNP          Tests to Keep You Healthy    Cervical Cancer Screening: ORDERED

## 2024-07-23 LAB
C TRACH DNA SPEC QL NAA+PROBE: NOT DETECTED
N GONORRHOEA DNA SPEC QL NAA+PROBE: NOT DETECTED

## 2024-07-26 LAB — 17OHP SERPL-MCNC: 105 NG/DL (ref 35–413)

## 2024-11-25 ENCOUNTER — OFFICE VISIT (OUTPATIENT)
Dept: FAMILY MEDICINE | Facility: CLINIC | Age: 24
End: 2024-11-25
Payer: COMMERCIAL

## 2024-11-25 ENCOUNTER — LAB VISIT (OUTPATIENT)
Dept: LAB | Facility: HOSPITAL | Age: 24
End: 2024-11-25
Attending: STUDENT IN AN ORGANIZED HEALTH CARE EDUCATION/TRAINING PROGRAM
Payer: COMMERCIAL

## 2024-11-25 VITALS
DIASTOLIC BLOOD PRESSURE: 68 MMHG | OXYGEN SATURATION: 100 % | SYSTOLIC BLOOD PRESSURE: 104 MMHG | BODY MASS INDEX: 15.77 KG/M2 | HEART RATE: 76 BPM | WEIGHT: 112.63 LBS | HEIGHT: 71 IN

## 2024-11-25 DIAGNOSIS — M88.9 PAGET'S DISEASE OF BONE (OSTEITIS DEFORMANS): ICD-10-CM

## 2024-11-25 DIAGNOSIS — M25.50 ARTHRALGIA, UNSPECIFIED JOINT: ICD-10-CM

## 2024-11-25 DIAGNOSIS — R63.4 ABNORMAL WEIGHT LOSS: ICD-10-CM

## 2024-11-25 DIAGNOSIS — Z00.00 ENCOUNTER FOR ANNUAL GENERAL MEDICAL EXAMINATION WITHOUT ABNORMAL FINDINGS IN ADULT: ICD-10-CM

## 2024-11-25 DIAGNOSIS — Z00.00 ENCOUNTER FOR ANNUAL GENERAL MEDICAL EXAMINATION WITHOUT ABNORMAL FINDINGS IN ADULT: Primary | ICD-10-CM

## 2024-11-25 DIAGNOSIS — E83.39 ADULT HYPOPHOSPHATASIA: ICD-10-CM

## 2024-11-25 DIAGNOSIS — E55.9 HYPOVITAMINOSIS D: ICD-10-CM

## 2024-11-25 LAB
ALBUMIN SERPL BCP-MCNC: 4.2 G/DL (ref 3.5–5.2)
ALP SERPL-CCNC: 27 U/L (ref 40–150)
ALT SERPL W/O P-5'-P-CCNC: 13 U/L (ref 10–44)
ANION GAP SERPL CALC-SCNC: 8 MMOL/L (ref 8–16)
AST SERPL-CCNC: 15 U/L (ref 10–40)
BASOPHILS # BLD AUTO: 0.04 K/UL (ref 0–0.2)
BASOPHILS NFR BLD: 0.7 % (ref 0–1.9)
BILIRUB SERPL-MCNC: 0.4 MG/DL (ref 0.1–1)
BUN SERPL-MCNC: 15 MG/DL (ref 6–20)
CALCIUM SERPL-MCNC: 9.3 MG/DL (ref 8.7–10.5)
CCP AB SER IA-ACNC: <0.5 U/ML
CHLORIDE SERPL-SCNC: 108 MMOL/L (ref 95–110)
CO2 SERPL-SCNC: 23 MMOL/L (ref 23–29)
CREAT SERPL-MCNC: 0.8 MG/DL (ref 0.5–1.4)
CRP SERPL-MCNC: <0.3 MG/L (ref 0–8.2)
DIFFERENTIAL METHOD BLD: NORMAL
EOSINOPHIL # BLD AUTO: 0 K/UL (ref 0–0.5)
EOSINOPHIL NFR BLD: 0.7 % (ref 0–8)
ERYTHROCYTE [DISTWIDTH] IN BLOOD BY AUTOMATED COUNT: 12.8 % (ref 11.5–14.5)
ERYTHROCYTE [SEDIMENTATION RATE] IN BLOOD BY WESTERGREN METHOD: 0 MM/HR (ref 0–20)
EST. GFR  (NO RACE VARIABLE): >60 ML/MIN/1.73 M^2
GGT SERPL-CCNC: 14 U/L (ref 8–55)
GLUCOSE SERPL-MCNC: 82 MG/DL (ref 70–110)
HCT VFR BLD AUTO: 40.1 % (ref 37–48.5)
HGB BLD-MCNC: 12.9 G/DL (ref 12–16)
IMM GRANULOCYTES # BLD AUTO: 0.01 K/UL (ref 0–0.04)
IMM GRANULOCYTES NFR BLD AUTO: 0.2 % (ref 0–0.5)
LYMPHOCYTES # BLD AUTO: 2 K/UL (ref 1–4.8)
LYMPHOCYTES NFR BLD: 35 % (ref 18–48)
MAGNESIUM SERPL-MCNC: 1.9 MG/DL (ref 1.6–2.6)
MCH RBC QN AUTO: 29.5 PG (ref 27–31)
MCHC RBC AUTO-ENTMCNC: 32.2 G/DL (ref 32–36)
MCV RBC AUTO: 92 FL (ref 82–98)
MONOCYTES # BLD AUTO: 0.4 K/UL (ref 0.3–1)
MONOCYTES NFR BLD: 6.9 % (ref 4–15)
NEUTROPHILS # BLD AUTO: 3.2 K/UL (ref 1.8–7.7)
NEUTROPHILS NFR BLD: 56.5 % (ref 38–73)
NRBC BLD-RTO: 0 /100 WBC
PHOSPHATE SERPL-MCNC: 3.9 MG/DL (ref 2.7–4.5)
PLATELET # BLD AUTO: 298 K/UL (ref 150–450)
PMV BLD AUTO: 9.7 FL (ref 9.2–12.9)
POTASSIUM SERPL-SCNC: 4.1 MMOL/L (ref 3.5–5.1)
PROT SERPL-MCNC: 6.5 G/DL (ref 6–8.4)
PTH-INTACT SERPL-MCNC: 39.7 PG/ML (ref 9–77)
RBC # BLD AUTO: 4.37 M/UL (ref 4–5.4)
SODIUM SERPL-SCNC: 139 MMOL/L (ref 136–145)
T4 FREE SERPL-MCNC: 0.99 NG/DL (ref 0.71–1.51)
TSH SERPL DL<=0.005 MIU/L-ACNC: 0.68 UIU/ML (ref 0.4–4)
WBC # BLD AUTO: 5.66 K/UL (ref 3.9–12.7)

## 2024-11-25 PROCEDURE — 82977 ASSAY OF GGT: CPT | Performed by: STUDENT IN AN ORGANIZED HEALTH CARE EDUCATION/TRAINING PROGRAM

## 2024-11-25 PROCEDURE — 99999 PR PBB SHADOW E&M-EST. PATIENT-LVL IV: CPT | Mod: PBBFAC,,, | Performed by: STUDENT IN AN ORGANIZED HEALTH CARE EDUCATION/TRAINING PROGRAM

## 2024-11-25 PROCEDURE — 85025 COMPLETE CBC W/AUTO DIFF WBC: CPT | Performed by: STUDENT IN AN ORGANIZED HEALTH CARE EDUCATION/TRAINING PROGRAM

## 2024-11-25 PROCEDURE — 86140 C-REACTIVE PROTEIN: CPT | Performed by: STUDENT IN AN ORGANIZED HEALTH CARE EDUCATION/TRAINING PROGRAM

## 2024-11-25 PROCEDURE — 84443 ASSAY THYROID STIM HORMONE: CPT | Performed by: STUDENT IN AN ORGANIZED HEALTH CARE EDUCATION/TRAINING PROGRAM

## 2024-11-25 PROCEDURE — 86200 CCP ANTIBODY: CPT | Performed by: STUDENT IN AN ORGANIZED HEALTH CARE EDUCATION/TRAINING PROGRAM

## 2024-11-25 PROCEDURE — 86038 ANTINUCLEAR ANTIBODIES: CPT | Performed by: STUDENT IN AN ORGANIZED HEALTH CARE EDUCATION/TRAINING PROGRAM

## 2024-11-25 PROCEDURE — 3008F BODY MASS INDEX DOCD: CPT | Mod: CPTII,S$GLB,, | Performed by: STUDENT IN AN ORGANIZED HEALTH CARE EDUCATION/TRAINING PROGRAM

## 2024-11-25 PROCEDURE — 3074F SYST BP LT 130 MM HG: CPT | Mod: CPTII,S$GLB,, | Performed by: STUDENT IN AN ORGANIZED HEALTH CARE EDUCATION/TRAINING PROGRAM

## 2024-11-25 PROCEDURE — 84100 ASSAY OF PHOSPHORUS: CPT | Performed by: STUDENT IN AN ORGANIZED HEALTH CARE EDUCATION/TRAINING PROGRAM

## 2024-11-25 PROCEDURE — 3078F DIAST BP <80 MM HG: CPT | Mod: CPTII,S$GLB,, | Performed by: STUDENT IN AN ORGANIZED HEALTH CARE EDUCATION/TRAINING PROGRAM

## 2024-11-25 PROCEDURE — 85651 RBC SED RATE NONAUTOMATED: CPT | Mod: PO | Performed by: STUDENT IN AN ORGANIZED HEALTH CARE EDUCATION/TRAINING PROGRAM

## 2024-11-25 PROCEDURE — 86431 RHEUMATOID FACTOR QUANT: CPT | Performed by: STUDENT IN AN ORGANIZED HEALTH CARE EDUCATION/TRAINING PROGRAM

## 2024-11-25 PROCEDURE — 83735 ASSAY OF MAGNESIUM: CPT | Performed by: STUDENT IN AN ORGANIZED HEALTH CARE EDUCATION/TRAINING PROGRAM

## 2024-11-25 PROCEDURE — 84439 ASSAY OF FREE THYROXINE: CPT | Performed by: STUDENT IN AN ORGANIZED HEALTH CARE EDUCATION/TRAINING PROGRAM

## 2024-11-25 PROCEDURE — 83970 ASSAY OF PARATHORMONE: CPT | Performed by: STUDENT IN AN ORGANIZED HEALTH CARE EDUCATION/TRAINING PROGRAM

## 2024-11-25 PROCEDURE — 1159F MED LIST DOCD IN RCRD: CPT | Mod: CPTII,S$GLB,, | Performed by: STUDENT IN AN ORGANIZED HEALTH CARE EDUCATION/TRAINING PROGRAM

## 2024-11-25 PROCEDURE — 82397 CHEMILUMINESCENT ASSAY: CPT | Performed by: STUDENT IN AN ORGANIZED HEALTH CARE EDUCATION/TRAINING PROGRAM

## 2024-11-25 PROCEDURE — 36415 COLL VENOUS BLD VENIPUNCTURE: CPT | Mod: PO | Performed by: STUDENT IN AN ORGANIZED HEALTH CARE EDUCATION/TRAINING PROGRAM

## 2024-11-25 PROCEDURE — 99214 OFFICE O/P EST MOD 30 MIN: CPT | Mod: S$GLB,,, | Performed by: STUDENT IN AN ORGANIZED HEALTH CARE EDUCATION/TRAINING PROGRAM

## 2024-11-25 PROCEDURE — 82652 VIT D 1 25-DIHYDROXY: CPT | Performed by: STUDENT IN AN ORGANIZED HEALTH CARE EDUCATION/TRAINING PROGRAM

## 2024-11-25 PROCEDURE — 80053 COMPREHEN METABOLIC PANEL: CPT | Performed by: STUDENT IN AN ORGANIZED HEALTH CARE EDUCATION/TRAINING PROGRAM

## 2024-11-25 RX ORDER — SPIRONOLACTONE 25 MG/1
25 TABLET ORAL DAILY
COMMUNITY

## 2024-11-25 NOTE — PROGRESS NOTES
SUBJECTIVE:    CHIEF COMPLAINT:   Chief Complaint   Patient presents with    Annual Exam           274}    HISTORY OF PRESENT ILLNESS:  History of Present Illness    CHIEF COMPLAINT:  Ms. Ocasio presents for an annual wellness visit with concerns about knee pain, generalized joint pain, and unintentional weight loss.    HPI:  Ms. Ocasio has a history of anxiety, elevated alkaline phosphatase, and hypophosphatemia, reports left knee pain that began yesterday, with occasional pain in the right knee and either elbow, suggesting generalized joint pain. She has unintentionally lost weight, currently weighing 112 lbs, down from 121 lbs in July, and recently weighing as low as 105 lbs. She denies restricting her eating and is uncertain about the cause of her rapid weight loss.    Ms. Ocasio has felt weak for the past few months, feeling unwell almost every other day, with pain being particularly severe recently. She initially attributed these symptoms to being overworked and fatigued. She reports working more frequently than usual, though she enjoys her job.    Ms. Ocasio has a history of chronically elevated alkaline phosphatase levels, which were previously substantially elevated. She consulted an endocrinologist for this issue but does not feel she has had consistent follow-up. She discontinued Strensiq, which she had been taking for about a year, due to difficulties obtaining medication,psychological strain and discomfort with self-administering injections.    Ms. Ocasio often feels feverish without actual fever. She reports bone pain, wondering if it is related to her hypophosphatemia. She takes Tylenol as needed for pain management.    Regarding her skin condition, the patient reports hormonal acne flare-ups, primarily on her face. She is planning to start Accutane treatment, though her acne is currently well-controlled with her skincare routine.    Ms. Ocasio denies fever, chills, chest pain, and  any changes in her exercise routine.    SOCIAL HISTORY:  Ms. Ocasio is working more frequently than before and enjoys her current job.      ROS:  General: +fever, -chills, +fatigue, -weight gain, -weight loss  Eyes: -vision changes, -redness, -discharge  ENT: -ear pain, -nasal congestion, -sore throat  Cardiovascular: -chest pain, -palpitations, -lower extremity edema  Respiratory: -cough, -shortness of breath  Gastrointestinal: -abdominal pain, -nausea, -vomiting, -diarrhea, -constipation, -blood in stool  Genitourinary: -dysuria, -hematuria, -frequency  Musculoskeletal: +joint pain, -muscle pain, +bone pain  Skin: -rash, -lesion  Neurological: -headache, -dizziness, -numbness, -tingling  Psychiatric: -anxiety, -depression, -sleep difficulty           PAST MEDICAL HISTORY:     274}  Past Medical History:   Diagnosis Date    ADHD (attention deficit hyperactivity disorder)     Anxiety     Depression        PAST SURGICAL HISTORY:  No past surgical history on file.    SOCIAL HISTORY:  Social History     Socioeconomic History    Marital status: Single   Tobacco Use    Smoking status: Never     Passive exposure: Yes    Smokeless tobacco: Never   Substance and Sexual Activity    Alcohol use: Yes     Comment: occasionally    Drug use: Never    Sexual activity: Yes     Partners: Female       FAMILY HISTORY:       Family History   Problem Relation Name Age of Onset    Collagen disease Neg Hx         ALLERGIES AND MEDICATIONS: updated and reviewed.      274}  Review of patient's allergies indicates:  No Known Allergies  Medication List with Changes/Refills   Current Medications    BUSPIRONE HCL (BUSPAR ORAL)    Take 5 mg by mouth as needed.    SPIRONOLACTONE (ALDACTONE) 25 MG TABLET    Take 25 mg by mouth once daily.   Discontinued Medications    BUPROPION (WELLBUTRIN XL) 150 MG TB24 TABLET    Take 1 tablet (150 mg total) by mouth once daily.       SCREENING HISTORY:    274}  Health Maintenance         Date Due Completion  "Date    TETANUS VACCINE 10/07/2023 10/7/2013    Influenza Vaccine (1) 09/01/2024 9/16/2020    COVID-19 Vaccine (1 - 2024-25 season) Never done ---    Chlamydia Screening 07/22/2025 7/22/2024    Pap Smear 07/22/2027 7/22/2024    RSV Vaccine (Age 60+ and Pregnant patients) (1 - 1-dose 75+ series) 09/13/2077 ---                  PHYSICAL EXAM:      274}  /68 (BP Location: Right arm, Patient Position: Sitting)   Pulse 76   Ht 5' 11" (1.803 m)   Wt 51.1 kg (112 lb 10.5 oz)   LMP 10/25/2024 (Approximate)   SpO2 100%   BMI 15.71 kg/m²   Wt Readings from Last 3 Encounters:   11/25/24 51.1 kg (112 lb 10.5 oz)   07/22/24 55.1 kg (121 lb 7.6 oz)   01/26/24 52.2 kg (115 lb)     BP Readings from Last 3 Encounters:   11/25/24 104/68   07/22/24 116/78   01/26/24 134/61     Estimated body mass index is 15.71 kg/m² as calculated from the following:    Height as of this encounter: 5' 11" (1.803 m).    Weight as of this encounter: 51.1 kg (112 lb 10.5 oz).       Physical Exam    Vitals: Weight: 112 lbs. BMI: 15.  General: No acute distress. Well-developed. Well-nourished.  Eyes: EOMI. Sclerae anicteric.  HENT: Normocephalic. Atraumatic. Nares patent. Moist oral mucosa.  Ears: Bilateral TMs clear. Bilateral EACs clear.  Cardiovascular: Regular rate. Regular rhythm. No murmurs. No rubs. No gallops. Normal S1, S2.  Respiratory: Normal respiratory effort. Clear to auscultation bilaterally. No rales. No rhonchi. No wheezing.  Abdomen: Soft. Non-tender. Non-distended. Normoactive bowel sounds.  Musculoskeletal: No  obvious deformity.  Extremities: No lower extremity edema.  Neurological: Alert & oriented x3. No slurred speech. Normal gait.  Psychiatric: Normal mood. Normal affect. Good insight. Good judgment.  Skin: Warm. Dry. No rash.    TEST RESULTS:  Ms. Ocasio's alkaline phosphatase level was substantially elevated in a previous test. Her current BMI is 15, which is noted as too low. Her weight was 121 lbs in July and is " currently 112 lbs. A previous test showed low phosphorus levels. A previous bone density scan revealed osteopenia.         LABS:   274}  I have reviewed old labs below:  Lab Results   Component Value Date    WBC 5.46 06/30/2023    HGB 12.8 06/30/2023    HCT 39.3 06/30/2023    MCV 88 06/30/2023     06/30/2023     10/05/2023    K 3.8 10/05/2023     10/05/2023    CALCIUM 9.4 10/05/2023    PHOS 4.6 (H) 10/05/2023    CO2 28 10/05/2023    GLU 85 10/05/2023    BUN 10 10/05/2023    CREATININE 0.8 10/05/2023    ANIONGAP 5 (L) 10/05/2023    ESTGFRAFRICA >60.0 05/11/2022    EGFRNONAA >60.0 05/11/2022    PROT 7.0 10/05/2023    ALBUMIN 4.5 07/22/2024    BILITOT 0.4 10/05/2023    ALKPHOS 432 (H) 10/05/2023    ALT 10 10/05/2023    AST 14 10/05/2023    CHOL 110 (L) 06/30/2023    TRIG 45 06/30/2023    HDL 59 06/30/2023    LDLCALC 42.0 (L) 06/30/2023    TSH 0.659 07/22/2024    HGBA1C 4.7 06/30/2023       ASSESSMENT AND PLAN:  274}  1. Encounter for annual general medical examination without abnormal findings in adult  -     CBC auto differential; Future; Expected date: 11/25/2024  -     Comprehensive metabolic panel; Future; Expected date: 11/25/2024  -     Sedimentation rate; Future; Expected date: 11/25/2024  -     TSH; Future; Expected date: 11/25/2024  -     T4, FREE; Future; Expected date: 11/25/2024    2. Hypovitaminosis D  -     Ambulatory referral/consult to Rheumatology; Future; Expected date: 11/26/2024    3. Adult hypophosphatasia  -     C-reactive protein; Future; Expected date: 11/25/2024  -     Ambulatory referral/consult to Rheumatology; Future; Expected date: 11/26/2024  -     Calcitriol; Future; Expected date: 11/25/2024  -     PTH, intact; Future; Expected date: 11/25/2024  -     PHOSPHORUS; Future; Expected date: 11/25/2024  -     PTH-RELATED PEPTIDE; Future; Expected date: 11/25/2024  -     Magnesium; Future; Expected date: 11/25/2024    4. Paget's disease of bone (osteitis deformans)  -      ROCAEL; Future; Expected date: 11/25/2024  -     Cyclic citrul peptide antibody, IgG; Future; Expected date: 11/25/2024  -     PTH-RELATED PEPTIDE; Future; Expected date: 11/25/2024  -     GAMMA GT; Future; Expected date: 11/25/2024    5. Abnormal weight loss  -     TSH; Future; Expected date: 11/25/2024  -     T4, FREE; Future; Expected date: 11/25/2024    6. Arthralgia, unspecified joint  -     Rheumatoid factor; Future; Expected date: 11/25/2024  -     Cyclic citrul peptide antibody, IgG; Future; Expected date: 11/25/2024  -     Ambulatory referral/consult to Rheumatology; Future; Expected date: 11/26/2024  -     PTH-RELATED PEPTIDE; Future; Expected date: 11/25/2024    7. BMI < 18.5  Comments:  BMI 15.  Now weighs 112 lbs .  Denies restrictive eating patterns.  Unclear about why she is losing weight         Assessment & Plan    IMPRESSION:   Concerned about chronically elevated alkaline phosphatase levels, possibly indicating Paget's disease of bone   Considering bisphosphonate therapy to address bone turnover issues, pending rheumatologist input   Monitoring weight loss and low BMI (15)   Evaluating possible hormonal imbalances affecting menstrual cycle due to low weight   Assessing generalized joint pain and weakness   Screening for rheumatoid arthritis given family history of inflammatory conditions   Evaluating need for Accutane treatment for acne, considering potential risks and benefits    UNDERWEIGHT AND BONE HEALTH:   Explained relationship between alkaline phosphatase, bone turnover, and potential bone disorders.   Discussed impact of low body weight on menstrual cycles.   Explained the role of PTH and vitamin D in calcium regulation and bone health.   Ms. Treviñoux to add protein shakes to meals.   Recommend increasing caloric intake to address low BMI and prevent further weight loss.   Ordered CMP, PTH level, vitamin D level, rheumatoid arthritis screening, PTH-related peptide, and potassium level as  annual labs.    PAGET'S DISEASE OF BONE:   Provided information on Paget's disease of bone and its potential progression if left untreated.   Referred to rheumatologist for evaluation of elevated alkaline phosphatase and possible Paget's disease of bone.    GENERALIZED ANXIETY DISORDER:   Continued buspirone as needed for anxiety.    ACNE:   Continued spironolactone for acne.    PAIN MANAGEMENT:   Continued Tylenol as needed for pain management.    FOLLOW-UP:   Follow up in 3-4 months.   Contact the office if any concerning symptoms develop before next appointment.         Orders Placed This Encounter   Procedures    CBC auto differential    Comprehensive metabolic panel    Sedimentation rate    C-reactive protein    ROCAEL    Rheumatoid factor    Cyclic citrul peptide antibody, IgG    TSH    T4, FREE    Calcitriol    PTH, intact    PHOSPHORUS    PTH-RELATED PEPTIDE    Magnesium    GAMMA GT    Ambulatory referral/consult to Rheumatology         This note was generated with the assistance of ambient listening technology. Verbal consent was obtained by the patient and accompanying visitor(s) for the recording of patient appointment to facilitate this note. I attest to having reviewed and edited the generated note for accuracy, though some syntax or spelling errors may persist. Please contact the author of this note for any clarification.

## 2024-11-26 LAB
ANA SER QL IF: NORMAL
RHEUMATOID FACT SERPL-ACNC: <13 IU/ML (ref 0–15)

## 2024-11-30 LAB — PTH RELATED PROT SERPL-SCNC: 0.5 PMOL/L

## 2024-12-02 LAB — 1,25(OH)2D3 SERPL-MCNC: 51 PG/ML (ref 20–79)

## 2024-12-18 ENCOUNTER — HOSPITAL ENCOUNTER (EMERGENCY)
Facility: HOSPITAL | Age: 24
Discharge: HOME | End: 2024-12-18
Attending: EMERGENCY MEDICINE
Payer: COMMERCIAL

## 2024-12-18 VITALS
TEMPERATURE: 99 F | BODY MASS INDEX: 15.73 KG/M2 | OXYGEN SATURATION: 99 % | WEIGHT: 112.75 LBS | DIASTOLIC BLOOD PRESSURE: 70 MMHG | SYSTOLIC BLOOD PRESSURE: 112 MMHG | HEART RATE: 64 BPM | RESPIRATION RATE: 16 BRPM

## 2024-12-18 DIAGNOSIS — R13.10 DYSPHAGIA, UNSPECIFIED TYPE: Primary | ICD-10-CM

## 2024-12-18 DIAGNOSIS — R06.00 DYSPNEA, UNSPECIFIED TYPE: ICD-10-CM

## 2024-12-18 PROCEDURE — 99283 EMERGENCY DEPT VISIT LOW MDM: CPT

## 2024-12-18 RX ORDER — ALBUTEROL SULFATE 90 UG/1
1-2 INHALANT RESPIRATORY (INHALATION) EVERY 6 HOURS PRN
Qty: 8 G | Refills: 0 | Status: SHIPPED | OUTPATIENT
Start: 2024-12-18 | End: 2025-12-18

## 2024-12-18 NOTE — DISCHARGE INSTRUCTIONS
Return to the ER for worsening symptoms or for any other concerns.  Follow up with the ENT and your PCP.

## 2024-12-18 NOTE — ED PROVIDER NOTES
Encounter Date: 12/18/2024       History     Chief Complaint   Patient presents with    Shortness of Breath     This is a 24-year-old female presenting with complaint shortness for breath.  She says that for the last 3 weeks she has had intermittent episodes of feeling like her throat was closing, with difficulty swallowing and difficulty taking a breath.  She says that this would occur 2 or 3 times a day.  It lasted for approximately 20 minutes before it resolved spontaneously.  She said it felt like someone was holding her throat tightly.  She says last night she was cleaning a litter box and she thinks the dust irritated her and she had this sensation for several hours.  It is improving and she denies symptoms currently.  She says that she has history of childhood asthma, and also had an asthma flare and was prescribed albuterol a couple of years ago, and thinks this might be her asthma flaring up.  She denies any cough or wheezing.  She denies any sore throat or painful swallowing.  She denies any GERD symptoms.    The history is provided by the patient.     Review of patient's allergies indicates:  No Known Allergies  Past Medical History:   Diagnosis Date    ADHD (attention deficit hyperactivity disorder)     Anxiety     Depression      History reviewed. No pertinent surgical history.  Family History   Problem Relation Name Age of Onset    Collagen disease Neg Hx       Social History     Tobacco Use    Smoking status: Never     Passive exposure: Yes    Smokeless tobacco: Never   Substance Use Topics    Alcohol use: Yes     Comment: occasionally    Drug use: Never     Review of Systems   HENT:  Positive for trouble swallowing.    Respiratory:  Positive for shortness of breath.    All other systems reviewed and are negative.      Physical Exam     Initial Vitals [12/18/24 0528]   BP Pulse Resp Temp SpO2   125/69 88 16 98.5 °F (36.9 °C) 100 %      MAP       --         Physical Exam    Nursing note and vitals  reviewed.  Constitutional: She appears well-developed and well-nourished. She is not diaphoretic. No distress.   HENT:   Head: Normocephalic. Mouth/Throat: Oropharynx is clear and moist. No oropharyngeal exudate.   Eyes: Conjunctivae are normal.   Neck: Neck supple. No thyromegaly present. No tracheal deviation present.   Normal range of motion.  Cardiovascular:  Normal rate.           Pulmonary/Chest: Breath sounds normal. No stridor. No respiratory distress. She has no wheezes. She has no rales.   Abdominal: She exhibits no distension.   Musculoskeletal:         General: No edema.      Cervical back: Normal range of motion and neck supple.     Lymphadenopathy:     She has no cervical adenopathy.   Neurological: She is alert. She has normal strength.   Skin: Skin is warm and dry.   Psychiatric: She has a normal mood and affect.         ED Course   Procedures  Labs Reviewed   HEPATITIS C ANTIBODY   HIV 1 / 2 ANTIBODY          Imaging Results    None          Medications - No data to display  Medical Decision Making  Patient's exam is normal.  Lung sounds are clear.  Pulse ox is 100% on room air.  Oropharyngeal and neck exam is also normal.  No stridor, no swelling, no lymphadenopathy, no goiter or mass, and posterior pharynx is clear.  I do not think she needs further emergent diagnostic evaluation at this time.  I will prescribe her an albuterol inhaler she can try when her symptoms occur, although I explained this does not sound typical an asthma attack.  I will refer her to ENT, and I also advised follow up with her PCP.  Return here for worsening symptoms.    Amount and/or Complexity of Data Reviewed  Labs: ordered.    Risk  Prescription drug management.                                      Clinical Impression:  Final diagnoses:  [R13.10] Dysphagia, unspecified type (Primary)  [R06.00] Dyspnea, unspecified type          ED Disposition Condition    Discharge Stable          ED Prescriptions       Medication Sig  Dispense Start Date End Date Auth. Provider    albuterol (PROVENTIL/VENTOLIN HFA) 90 mcg/actuation inhaler Inhale 1-2 puffs into the lungs every 6 (six) hours as needed for Shortness of Breath. Rescue 8 g 12/18/2024 12/18/2025 Hardik Terrazas MD          Follow-up Information       Follow up With Specialties Details Why Contact Info    Stan Arellano,  Family Medicine, Hospitalist   [address] [place] [id number]  [phone]               Hardik Terrazas MD  12/18/24 0636

## 2025-01-02 ENCOUNTER — LAB VISIT (OUTPATIENT)
Dept: LAB | Facility: HOSPITAL | Age: 25
End: 2025-01-02
Attending: DERMATOLOGY
Payer: COMMERCIAL

## 2025-01-02 DIAGNOSIS — L70.0 NODULAR ELASTOSIS WITH CYSTS AND COMEDONES OF FAVRE AND RACOUCHOT: Primary | ICD-10-CM

## 2025-01-02 DIAGNOSIS — L57.8 NODULAR ELASTOSIS WITH CYSTS AND COMEDONES OF FAVRE AND RACOUCHOT: Primary | ICD-10-CM

## 2025-01-02 LAB
ALBUMIN SERPL BCP-MCNC: 4.3 G/DL (ref 3.5–5.2)
ALP SERPL-CCNC: 29 U/L (ref 40–150)
ALT SERPL W/O P-5'-P-CCNC: 10 U/L (ref 10–44)
AST SERPL-CCNC: 17 U/L (ref 10–40)
BILIRUB DIRECT SERPL-MCNC: 0.2 MG/DL (ref 0.1–0.3)
BILIRUB SERPL-MCNC: 0.4 MG/DL (ref 0.1–1)
CHOLEST SERPL-MCNC: 153 MG/DL (ref 120–199)
CHOLEST/HDLC SERPL: 2.7 {RATIO} (ref 2–5)
HCG INTACT+B SERPL-ACNC: <2.4 MIU/ML
HDLC SERPL-MCNC: 56 MG/DL (ref 40–75)
HDLC SERPL: 36.6 % (ref 20–50)
LDLC SERPL CALC-MCNC: 80.2 MG/DL (ref 63–159)
NONHDLC SERPL-MCNC: 97 MG/DL
PROT SERPL-MCNC: 7.4 G/DL (ref 6–8.4)
TRIGL SERPL-MCNC: 84 MG/DL (ref 30–150)

## 2025-01-02 PROCEDURE — 80076 HEPATIC FUNCTION PANEL: CPT | Performed by: DERMATOLOGY

## 2025-01-02 PROCEDURE — 84702 CHORIONIC GONADOTROPIN TEST: CPT | Performed by: DERMATOLOGY

## 2025-01-02 PROCEDURE — 36415 COLL VENOUS BLD VENIPUNCTURE: CPT | Mod: PO | Performed by: DERMATOLOGY

## 2025-01-02 PROCEDURE — 80061 LIPID PANEL: CPT | Performed by: DERMATOLOGY

## 2025-01-03 ENCOUNTER — LAB VISIT (OUTPATIENT)
Dept: LAB | Facility: HOSPITAL | Age: 25
End: 2025-01-03
Attending: PHYSICIAN ASSISTANT
Payer: COMMERCIAL

## 2025-01-03 ENCOUNTER — OFFICE VISIT (OUTPATIENT)
Dept: OTOLARYNGOLOGY | Facility: CLINIC | Age: 25
End: 2025-01-03
Payer: COMMERCIAL

## 2025-01-03 VITALS
HEIGHT: 62 IN | DIASTOLIC BLOOD PRESSURE: 64 MMHG | BODY MASS INDEX: 21.17 KG/M2 | HEART RATE: 61 BPM | SYSTOLIC BLOOD PRESSURE: 100 MMHG | WEIGHT: 115.06 LBS

## 2025-01-03 DIAGNOSIS — R13.10 DYSPHAGIA, UNSPECIFIED TYPE: ICD-10-CM

## 2025-01-03 DIAGNOSIS — J39.2 SPASM OF THROAT: Primary | ICD-10-CM

## 2025-01-03 PROCEDURE — 3074F SYST BP LT 130 MM HG: CPT | Mod: CPTII,S$GLB,, | Performed by: PHYSICIAN ASSISTANT

## 2025-01-03 PROCEDURE — 1159F MED LIST DOCD IN RCRD: CPT | Mod: CPTII,S$GLB,, | Performed by: PHYSICIAN ASSISTANT

## 2025-01-03 PROCEDURE — 36415 COLL VENOUS BLD VENIPUNCTURE: CPT | Mod: PO | Performed by: PHYSICIAN ASSISTANT

## 2025-01-03 PROCEDURE — 99999 PR PBB SHADOW E&M-EST. PATIENT-LVL III: CPT | Mod: PBBFAC,,, | Performed by: PHYSICIAN ASSISTANT

## 2025-01-03 PROCEDURE — 3078F DIAST BP <80 MM HG: CPT | Mod: CPTII,S$GLB,, | Performed by: PHYSICIAN ASSISTANT

## 2025-01-03 PROCEDURE — 1160F RVW MEDS BY RX/DR IN RCRD: CPT | Mod: CPTII,S$GLB,, | Performed by: PHYSICIAN ASSISTANT

## 2025-01-03 PROCEDURE — 86038 ANTINUCLEAR ANTIBODIES: CPT | Performed by: PHYSICIAN ASSISTANT

## 2025-01-03 PROCEDURE — 3008F BODY MASS INDEX DOCD: CPT | Mod: CPTII,S$GLB,, | Performed by: PHYSICIAN ASSISTANT

## 2025-01-03 PROCEDURE — 99204 OFFICE O/P NEW MOD 45 MIN: CPT | Mod: S$GLB,,, | Performed by: PHYSICIAN ASSISTANT

## 2025-01-03 RX ORDER — ISOTRETINOIN 20 MG/1
CAPSULE ORAL
COMMUNITY
Start: 2024-12-02

## 2025-01-03 RX ORDER — PREDNISONE 10 MG/1
5 TABLET ORAL DAILY
COMMUNITY
Start: 2024-12-23

## 2025-01-03 RX ORDER — NORETHINDRONE 0.35 MG/1
1 TABLET ORAL
COMMUNITY
Start: 2024-12-06

## 2025-01-03 NOTE — PROGRESS NOTES
[facility]    Subjective:      Patient: Munira Ocasio Patient PCP: Stan Arellano DO         :  2000     Sex:  female      MRN:  [id number]          Date of Visit: 2025      Chief Complaint: Episodic throat closure    Patient ID: Munira Ocasio is a 24 y.o. female who presents to office for evaluation of episodic dysphagia. Pt states that around 2 months ago she started having episodes of shortness of breath and sensation of mild throat closure. Pt states that within the past 1 month she has episoses where she feels like she can barely swallow all day long-especially around the past 2 weeks. Pt states that for that there was a 2 week period in the middle of 2024 where episodes happened mildly all day every day. Pt states that the she has occasional episodes of not being able to breathe well and she had issues breathing all day during the 2 week period when her symptoms were the worse. Pt denies food allergies. She denies lip swelling/tingling. Pt states that she has noticed her face has been puffy for the past few months. Pt denies h/o autoimmune disease. Pt has h/o asthma as a child, but she has grown out of it. She has had episodes of not breathing in her sleep 6 times. She does not have history of apneic episodes. She has issues even after she wakes up from sleep. Pt states that albuterol inhaler has not been helping her issues. She is not having significant issues today. She has recently started accutane.       Past Medical History  She has a past medical history of ADHD (attention deficit hyperactivity disorder), Anxiety, and Depression.    Family History  Her family history is not on file.    No past surgical history on file.  Social History     Tobacco Use    Smoking status: Some Days     Types: Vaping with nicotine     Passive exposure: Yes    Smokeless tobacco: Never   Substance and Sexual Activity    Alcohol use: Yes     Comment: occasionally    Drug use: Never    Sexual  "activity: Yes     Partners: Female     Medications  She has a current medication list which includes the following prescription(s): albuterol, buspirone hcl, isotretinoin, norethindrone, prednisone, and spironolactone.    Review of patient's allergies indicates:  No Known Allergies  All medications, allergies, and past history have been reviewed.    Objective:      Vitals:      11/25/2024     8:42 AM 12/18/2024     5:28 AM 1/3/2025     8:41 AM   Vitals - 1 value per visit   SYSTOLIC 104 125 100   DIASTOLIC 68 69 64   Pulse 76 88 61   Temp  98.5 °F (36.9 °C)    Resp  16    SPO2 100 % 100 %    Weight (lb) 112.66 112.77 115.08   Weight (kg) 51.1 51.15 52.2   Height 5' 11" (1.803 m)  5' 1.5" (1.562 m)   BMI (Calculated) 15.7  21.4   Pain Score Four  Zero       Body surface area is 1.51 meters squared.  Physical Exam  Constitutional:       General: She is not in acute distress.     Appearance: Normal appearance. She is not ill-appearing.   HENT:      Head: Normocephalic and atraumatic.      Right Ear: Tympanic membrane, ear canal and external ear normal.      Left Ear: Tympanic membrane, ear canal and external ear normal.      Nose: Nose normal.      Mouth/Throat:      Lips: Pink. No lesions.      Mouth: Mucous membranes are moist. No oral lesions.      Tongue: No lesions.      Palate: No lesions.      Pharynx: Oropharynx is clear. Uvula midline. No pharyngeal swelling, oropharyngeal exudate, posterior oropharyngeal erythema or uvula swelling.   Eyes:      General:         Right eye: No discharge.         Left eye: No discharge.      Extraocular Movements: Extraocular movements intact.      Conjunctiva/sclera: Conjunctivae normal.   Pulmonary:      Effort: Pulmonary effort is normal.   Neurological:      General: No focal deficit present.      Mental Status: She is alert and oriented to person, place, and time. Mental status is at baseline.   Psychiatric:         Mood and Affect: Mood normal.         Behavior: Behavior " normal.         Thought Content: Thought content normal.         Judgment: Judgment normal.         Labs:  WBC   Date Value Ref Range Status   11/25/2024 5.66 3.90 - 12.70 K/uL Final     Eosinophil %   Date Value Ref Range Status   11/25/2024 0.7 0.0 - 8.0 % Final     Eos #   Date Value Ref Range Status   11/25/2024 0.0 0.0 - 0.5 K/uL Final     Platelets   Date Value Ref Range Status   11/25/2024 298 150 - 450 K/uL Final     Glucose   Date Value Ref Range Status   11/25/2024 82 70 - 110 mg/dL Final     All lab results, imaging results, and data have been reviewed.    Assessment:        ICD-10-CM ICD-9-CM   1. Spasm of throat  J39.2 478.29   2. Dysphagia, unspecified type  R13.10 787.20            Plan:      Spasm of throat    Dysphagia, unspecified type  -     Ambulatory referral/consult to ENT  -     Fl Modified Barium Swallow Speech; Future; Expected date: 01/03/2025  -     SLP video swallow; Future; Expected date: 01/03/2025  -     ROCAEL Screen w/Reflex; Future; Expected date: 01/03/2025       Symptoms appear to be secondary to likely laryngospasm. She recently started accutane, so anxiety could be cause of issues, but she is not currently having issues with anxiety. Hold on vaping. No food allergies. Pt recently started accutane. We discussed and she will reach out to her dermatologist to discontinue accutane and we will follow up in 1 month to further assess symptoms after discontinuing accutane. She will proceed with ROCAEL with reflex and MBSS at this time. We discussed nasopharyngoscopy to further assess. She will defer at this time and proceed with 1 month follow up and if symptoms persistent after discontinuing accutane, then we will proceed with nasopharyngscopy at follow up. If symptoms persistent at follow up, then we will also proceed with PFT, although I do not suspect pulmonary etiology at this time. If waking up catching her breath at night continues after discontinuing accutane, then we will also  proceed with sleep study at follow up.

## 2025-01-06 LAB — ANA SER QL IF: NORMAL

## 2025-01-24 ENCOUNTER — LAB VISIT (OUTPATIENT)
Dept: LAB | Facility: HOSPITAL | Age: 25
End: 2025-01-24
Attending: DERMATOLOGY
Payer: COMMERCIAL

## 2025-01-24 DIAGNOSIS — L57.8 NODULAR ELASTOSIS WITH CYSTS AND COMEDONES OF FAVRE AND RACOUCHOT: Primary | ICD-10-CM

## 2025-01-24 DIAGNOSIS — L70.0 NODULAR ELASTOSIS WITH CYSTS AND COMEDONES OF FAVRE AND RACOUCHOT: Primary | ICD-10-CM

## 2025-01-24 PROCEDURE — 80061 LIPID PANEL: CPT | Performed by: DERMATOLOGY

## 2025-01-24 PROCEDURE — 84702 CHORIONIC GONADOTROPIN TEST: CPT | Performed by: DERMATOLOGY

## 2025-01-24 PROCEDURE — 80076 HEPATIC FUNCTION PANEL: CPT | Performed by: DERMATOLOGY

## 2025-01-24 PROCEDURE — 36415 COLL VENOUS BLD VENIPUNCTURE: CPT | Mod: PO | Performed by: DERMATOLOGY

## 2025-01-25 LAB
ALBUMIN SERPL BCP-MCNC: 4.3 G/DL (ref 3.5–5.2)
ALP SERPL-CCNC: 28 U/L (ref 40–150)
ALT SERPL W/O P-5'-P-CCNC: 12 U/L (ref 10–44)
AST SERPL-CCNC: 16 U/L (ref 10–40)
BILIRUB DIRECT SERPL-MCNC: 0.1 MG/DL (ref 0.1–0.3)
BILIRUB SERPL-MCNC: 0.4 MG/DL (ref 0.1–1)
CHOLEST SERPL-MCNC: 150 MG/DL (ref 120–199)
CHOLEST/HDLC SERPL: 2.8 {RATIO} (ref 2–5)
HCG INTACT+B SERPL-ACNC: <2.4 MIU/ML
HDLC SERPL-MCNC: 54 MG/DL (ref 40–75)
HDLC SERPL: 36 % (ref 20–50)
LDLC SERPL CALC-MCNC: 79.2 MG/DL (ref 63–159)
NONHDLC SERPL-MCNC: 96 MG/DL
PROT SERPL-MCNC: 7.4 G/DL (ref 6–8.4)
TRIGL SERPL-MCNC: 84 MG/DL (ref 30–150)